# Patient Record
Sex: FEMALE | Race: WHITE | Employment: OTHER
[De-identification: names, ages, dates, MRNs, and addresses within clinical notes are randomized per-mention and may not be internally consistent; named-entity substitution may affect disease eponyms.]

---

## 2017-07-08 ENCOUNTER — HEALTH MAINTENANCE LETTER (OUTPATIENT)
Age: 77
End: 2017-07-08

## 2018-05-21 ENCOUNTER — OFFICE VISIT (OUTPATIENT)
Dept: DERMATOLOGY | Facility: CLINIC | Age: 78
End: 2018-05-21
Payer: COMMERCIAL

## 2018-05-21 VITALS — SYSTOLIC BLOOD PRESSURE: 130 MMHG | TEMPERATURE: 98.7 F | DIASTOLIC BLOOD PRESSURE: 73 MMHG | HEART RATE: 56 BPM

## 2018-05-21 DIAGNOSIS — L65.9 LOSS OF HAIR: Primary | ICD-10-CM

## 2018-05-21 DIAGNOSIS — L65.0 TELOGEN EFFLUVIUM: ICD-10-CM

## 2018-05-21 LAB
ALBUMIN SERPL-MCNC: 3.5 G/DL (ref 3.4–5)
ALP SERPL-CCNC: 83 U/L (ref 40–150)
ALT SERPL W P-5'-P-CCNC: 25 U/L (ref 0–50)
ANION GAP SERPL CALCULATED.3IONS-SCNC: 6 MMOL/L (ref 3–14)
AST SERPL W P-5'-P-CCNC: 34 U/L (ref 0–45)
BILIRUB SERPL-MCNC: 0.4 MG/DL (ref 0.2–1.3)
BUN SERPL-MCNC: 17 MG/DL (ref 7–30)
CALCIUM SERPL-MCNC: 8.6 MG/DL (ref 8.5–10.1)
CHLORIDE SERPL-SCNC: 112 MMOL/L (ref 94–109)
CO2 SERPL-SCNC: 25 MMOL/L (ref 20–32)
CREAT SERPL-MCNC: 0.89 MG/DL (ref 0.52–1.04)
DEPRECATED CALCIDIOL+CALCIFEROL SERPL-MC: 52 UG/L (ref 20–75)
FERRITIN SERPL-MCNC: 109 NG/ML (ref 8–252)
GFR SERPL CREATININE-BSD FRML MDRD: 62 ML/MIN/1.7M2
GLUCOSE SERPL-MCNC: 89 MG/DL (ref 70–99)
POTASSIUM SERPL-SCNC: 4.4 MMOL/L (ref 3.4–5.3)
PROT SERPL-MCNC: 6.7 G/DL (ref 6.8–8.8)
SODIUM SERPL-SCNC: 143 MMOL/L (ref 133–144)
TSH SERPL DL<=0.005 MIU/L-ACNC: 1.16 MU/L (ref 0.4–4)

## 2018-05-21 PROCEDURE — 84443 ASSAY THYROID STIM HORMONE: CPT | Performed by: PHYSICIAN ASSISTANT

## 2018-05-21 PROCEDURE — 86038 ANTINUCLEAR ANTIBODIES: CPT | Performed by: PHYSICIAN ASSISTANT

## 2018-05-21 PROCEDURE — 80053 COMPREHEN METABOLIC PANEL: CPT | Performed by: PHYSICIAN ASSISTANT

## 2018-05-21 PROCEDURE — 82728 ASSAY OF FERRITIN: CPT | Performed by: PHYSICIAN ASSISTANT

## 2018-05-21 PROCEDURE — 99203 OFFICE O/P NEW LOW 30 MIN: CPT | Performed by: PHYSICIAN ASSISTANT

## 2018-05-21 PROCEDURE — 99000 SPECIMEN HANDLING OFFICE-LAB: CPT | Performed by: PHYSICIAN ASSISTANT

## 2018-05-21 PROCEDURE — 84425 ASSAY OF VITAMIN B-1: CPT | Mod: 90 | Performed by: PHYSICIAN ASSISTANT

## 2018-05-21 PROCEDURE — 82306 VITAMIN D 25 HYDROXY: CPT | Performed by: PHYSICIAN ASSISTANT

## 2018-05-21 PROCEDURE — 84630 ASSAY OF ZINC: CPT | Mod: 90 | Performed by: PHYSICIAN ASSISTANT

## 2018-05-21 NOTE — LETTER
5/21/2018         RE: Danielle Diaz  [address] [facility] [address]        Dear Colleague,    Thank you for referring your patient, Danielle Diaz, to the [facility]. Please see a copy of my visit note below.    HPI:   Danielle Diaz is a 77 year old female who presents for evaluation of hair loss  chief complaint  Location: scalp - diffuse loss from her scalp   Condition present for:  2 months.   Previous treatments include: none  -recently started taking clonazepam for anxiety just prior to hair loss    Review Of Systems  Eyes: negative  Ears/Nose/Throat: negative  Respiratory: No shortness of breath, dyspnea on exertion, cough, or hemoptysis  Cardiovascular: negative  Gastrointestinal: negative  Genitourinary: negative  Musculoskeletal: negative  Neurologic: negative  Psychiatric: negative        PHYSICAL EXAM:    /73  Pulse 56  Temp 98.7  F (37.1  C) (Tympanic)  Skin exam performed as follows: Type 2 skin. Mood appropriate  Alert and Oriented X 3. Well developed, well nourished in no distress.  General appearance: Normal  Head including face: Normal  Eyes: conjunctiva and lids: Normal  Mouth: Lips, teeth, gums: Normal  Neck: Normal  Chest-breast/axillae: Normal  Back: Normal  Spleen and liver: Normal  Cardiovascular: Exam of peripheral vascular system by observation for swelling, varicosities, edema: Normal  Genitalia: groin, buttocks: Normal  Extremities: digits/nails (clubbing): Normal  Eccrine and Apocrine glands: Normal  Right upper extremity: Normal  Left upper extremity: Normal  Right lower extremity: Normal  Left lower extremity: Normal  Skin: Scalp and body hair: See below    1. Scalp normal without erythema; + hair pull    ASSESSMENT/PLAN:     1. 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 Discussed secondary to stress or change in health. Discussed that condition is typically self limited and can last 3-6 months; hair density will likely  return, but does not always do so.   --Check CBC, CMP, TSH, OLVIN, Ferritn, Vitamin D, Zinc, B1        Follow-up: PRN/pending labs  CC:   Scribed By: Wendi Montelongo, MS, PA-C      Again, thank you for allowing me to participate in the care of your patient.        Sincerely,        Wendi Montelongo PA-C

## 2018-05-21 NOTE — MR AVS SNAPSHOT
"              After Visit Summary   5/21/2018    Danielle Diaz    MRN: [id number]           Patient Information     Date Of Birth          1940        Visit Information        Provider Department      5/21/2018 12:45 PM Wendi Montelongo PA-C [facility]        Today's Diagnoses     Loss of hair    -  1      Care Instructions    You have telogen effluvium - stress response that causes loss of hair. This usually lasts for 3-6 months, then you will notice the shedding slow down. It is normal to lose about 100 hairs per day. Slowly, your hair will grow back.     Check blood work today to make sure you do not have any vitamin deficiencies.     Could be from the new medication (clonazepam)               Follow-ups after your visit        Who to contact     If you have questions or need follow up information about today's clinic visit or your schedule please contact [facility] directly at [phone].  Normal or non-critical lab and imaging results will be communicated to you by MyChart, letter or phone within 4 business days after the clinic has received the results. If you do not hear from us within 7 days, please contact the clinic through [employer] or phone. If you have a critical or abnormal lab result, we will notify you by phone as soon as possible.  Submit refill requests through [employer] or call your pharmacy and they will forward the refill request to us. Please allow 3 business days for your refill to be completed.          Additional Information About Your Visit        MyChart Information     [employer] lets you send messages to your doctor, view your test results, renew your prescriptions, schedule appointments and more. To sign up, go to [email or web address] . Click on \"Log in\" on the left side of the screen, which will take you to the Welcome page. Then click on \"Sign up Now\" on the right side of the page.     You will be asked to enter the access code listed below, as well " as some personal information. Please follow the directions to create your username and password.     Your access code is: [email or web address]  Expires: 2018  1:21 PM     Your access code will  in 90 days. If you need help or a new code, please call your [facility] clinic or [phone].        Care EveryWhere ID     This is your Care EveryWhere ID. This could be used by other organizations to access your [facility] medical records  [id number]        Your Vitals Were     Pulse Temperature                56 98.7  F (37.1  C) (Tympanic)           Blood Pressure from Last 3 Encounters:   18 130/73   02/25/15 140/86   11 124/80    Weight from Last 3 Encounters:   02/25/15 96.2 kg (212 lb)   11 82.6 kg (182 lb)   11 81.2 kg (179 lb)              We Performed the Following     Anti Nuclear Halina IgG by IFA with Reflex     CBC with platelets     Comprehensive metabolic panel     Ferritin     TSH with free T4 reflex     Vitamin B1 whole blood     Vitamin D Deficiency     Zinc        Primary Care Provider Office Phone # Fax #    Rachana Montilla [phone]       [address] [place] [address]        Equal Access to Services     SARAH BETH HARRIS AH: Hadii mauricio ku hadasho Soomaali, waaxda luqadaha, qaybta kaalmada adeegyada, donna silva. So [facility] [phone].    ATENCIÓN: Si habla español, tiene a ozuna disposición servicios gratuitos de asistencia lingüística. Llame al [phone].    We comply with applicable federal civil rights laws and [place] laws. We do not discriminate on the basis of race, color, national origin, age, disability, sex, sexual orientation, or gender identity.            Thank you!     Thank you for choosing [facility]  for your care. Our goal is always to provide you with excellent care. Hearing back from our patients is one way we can continue to improve our services. Please take a few minutes to complete the written  survey that you may receive in the mail after your visit with us. Thank you!             Your Updated Medication List - Protect others around you: Learn how to safely use, store and throw away your medicines at www.disposemymeds.org.          This list is accurate as of 5/21/18  1:21 PM.  Always use your most recent med list.                   Brand Name Dispense Instructions for use Diagnosis    ** PATIENT ALERT **      Warning:  DO NOT EXCEED 4,000 MG OF ACETAMINOPHEN FROM ALL SOURCES IN 24 HOURS        * albuterol (2.5 MG/3ML) 0.083% neb solution     1 Box    Take 3 mLs by nebulization every 6 hours as needed for shortness of breath / dyspnea.    Severe persistent asthma       * VENTOLIN  (90 Base) MCG/ACT Inhaler   Generic drug:  albuterol     1 Inhaler    Inhale 2 puffs into the lungs every 4 hours as needed. FOR COUGH, 2 PUFFS 15 MINUTES BEFORE EXERCISE    Rhinitis, allergic, perennial, Severe persistent asthma       atenolol 25 MG tablet    TENORMIN    30 tablet    Take 1 tablet by mouth daily.    Essential hypertension, benign       CALCIUM + D PO      1200mg ORALLY DAILY        cetirizine 10 MG tablet    zyrTEC    90 tablet    Take 1 tablet (10 mg) by mouth every evening    Dermatitis       desonide 0.05 % cream    DESOWEN    60 g    BID AS NEEDED/COPOUND WITH KETOCONAZOLE 2%    Contact dermatitis and other eczema, due to unspecified cause       desoximetasone 0.25 % cream    TOPICORT    100 g    Apply sparingly to affected area twice daily for 14 days.  Do not apply to face.    Dermatitis       fish oil-omega-3 fatty acids 1000 MG capsule      1 CAPSULE ORALLY DAILY        hydrOXYzine 25 MG tablet    ATARAX    60 tablet    Take 1-2 tablets (25-50 mg) by mouth At Bedtime    Dermatitis       MUCINEX 600 MG 12 hr tablet   Generic drug:  guaiFENesin      1 TABLET ORALLY EVERY 12 HOURS AS NEEDED        MULTIVITAMIN PO      1 TABLET ORALLY DAILY        * oxyCODONE IR 5 MG tablet    ROXICODONE    60 tablet     Take 1 tablet by mouth every 8 hours as needed for pain. Up to 2/day. Fill in June    Unspecified sinusitis (chronic), Generalized pain       * oxyCODONE IR 5 MG tablet    ROXICODONE    60 tablet    Take 1 tablet by mouth every 8 hours as needed for pain. Up to 2/day. Fill in July    Unspecified sinusitis (chronic), Generalized pain       * oxyCODONE IR 5 MG tablet    ROXICODONE    60 tablet    Take 1 tablet by mouth every 8 hours as needed for pain. Up to 2/day. Fill in August    Unspecified sinusitis (chronic), Generalized pain       pantoprazole 40 MG EC tablet    PROTONIX    60 tablet    Take 1 tablet by mouth 2 times daily.    GERD (gastroesophageal reflux disease), Nausea       PRISTIQ 50 MG 24 hr tablet   Generic drug:  desvenlafaxine succinate      Take 50 mg by mouth daily. 1 tab in am, per Dr. Mónica Herring,  in [place]        RELPAX 40 MG tablet   Generic drug:  eletriptan     6  boxes    ONE TABLET WITH ONSET OF MIGRAINE, MAY REPEAT ONCE AFTER 2 HOURS. DO NOT EXCEED 2 TABLETS IN 24 HOURS.    Other forms of migraine, with intractable migraine, so stated, without mention of status migrainosus       simvastatin 40 MG tablet    ZOCOR    90 tablet    Take 1 tablet by mouth At Bedtime. at bedtime.    Hyperlipidemia LDL goal <130       SINGULAIR 10 MG tablet   Generic drug:  montelukast      Take 10 mg by mouth At Bedtime.        SYMBICORT 160-4.5 MCG/ACT Inhaler   Generic drug:  budesonide-formoterol     2 Inhaler    2 puffs every 12 hours    Severe persistent asthma, Rhinitis, allergic, perennial       topiramate 50 MG tablet    TOPAMAX    60 tablet    Take  by mouth. Take 1/2 tab in am, and one at bedtime (please let me know if can't be cut), after 4 days, can go to 50 mg bid    Headache(784.0)       traZODone 100 MG tablet    DESYREL    30 tablet    Take  by mouth. for sleepONE TABLET AT BEDTIME AS NEEDED FOR SLEEP    Major depressive disorder, recurrent episode, unspecified, Sleep disorder        TYLENOL EXTRA STRENGTH 500 MG Tabs      3 TABLETS ORALLY EVERY 4 HOURS AS NEEDED        ZOFRAN 8 MG tablet   Generic drug:  ondansetron     68 Tab    1 TABLET 2 TWICE DAILY AS NEEDED- NAUSEA    GERD (gastroesophageal reflux disease), Nausea       * Notice:  This list has 5 medication(s) that are the same as other medications prescribed for you. Read the directions carefully, and ask your doctor or other care provider to review them with you.

## 2018-05-21 NOTE — PATIENT INSTRUCTIONS
You have telogen effluvium - stress response that causes loss of hair. This usually lasts for 3-6 months, then you will notice the shedding slow down. It is normal to lose about 100 hairs per day. Slowly, your hair will grow back.     Check blood work today to make sure you do not have any vitamin deficiencies.     Could be from the new medication (clonazepam)

## 2018-05-21 NOTE — PROGRESS NOTES
HPI:   Danielle Diaz is a 77 year old female who presents for evaluation of hair loss  chief complaint  Location: scalp - diffuse loss from her scalp   Condition present for:  2 months.   Previous treatments include: none  -recently started taking clonazepam for anxiety just prior to hair loss    Review Of Systems  Eyes: negative  Ears/Nose/Throat: negative  Respiratory: No shortness of breath, dyspnea on exertion, cough, or hemoptysis  Cardiovascular: negative  Gastrointestinal: negative  Genitourinary: negative  Musculoskeletal: negative  Neurologic: negative  Psychiatric: negative        PHYSICAL EXAM:    /73  Pulse 56  Temp 98.7  F (37.1  C) (Tympanic)  Skin exam performed as follows: Type 2 skin. Mood appropriate  Alert and Oriented X 3. Well developed, well nourished in no distress.  General appearance: Normal  Head including face: Normal  Eyes: conjunctiva and lids: Normal  Mouth: Lips, teeth, gums: Normal  Neck: Normal  Chest-breast/axillae: Normal  Back: Normal  Spleen and liver: Normal  Cardiovascular: Exam of peripheral vascular system by observation for swelling, varicosities, edema: Normal  Genitalia: groin, buttocks: Normal  Extremities: digits/nails (clubbing): Normal  Eccrine and Apocrine glands: Normal  Right upper extremity: Normal  Left upper extremity: Normal  Right lower extremity: Normal  Left lower extremity: Normal  Skin: Scalp and body hair: See below    1. Scalp normal without erythema; + hair pull    ASSESSMENT/PLAN:     1. 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 Discussed secondary to stress or change in health. Discussed that condition is typically self limited and can last 3-6 months; hair density will likely return, but does not always do so.   --Check CBC, CMP, TSH, OLVIN, Ferritn, Vitamin D, Zinc, B1        Follow-up: PRN/pending labs  CC:   Scribed By: Wendi Montelongo, MS, PA-C

## 2018-05-22 LAB — ANA SER QL IF: NEGATIVE

## 2018-05-23 LAB
ERYTHROCYTE [DISTWIDTH] IN BLOOD BY AUTOMATED COUNT: NORMAL % (ref 10–15)
HCT VFR BLD AUTO: NORMAL % (ref 35–47)
HGB BLD-MCNC: NORMAL G/DL (ref 11.7–15.7)
MCH RBC QN AUTO: NORMAL PG (ref 26.5–33)
MCHC RBC AUTO-ENTMCNC: NORMAL G/DL (ref 31.5–36.5)
MCV RBC AUTO: NORMAL FL (ref 78–100)
PLATELET # BLD AUTO: NORMAL 10E9/L (ref 150–450)
RBC # BLD AUTO: NORMAL 10E12/L (ref 3.8–5.2)
RETICS # AUTO: NORMAL 10E9/L (ref 25–95)
RETICS/RBC NFR AUTO: NORMAL % (ref 0.5–2)
WBC # BLD AUTO: NORMAL 10E9/L (ref 4–11)
ZINC SERPL-MCNC: 60 UG/DL (ref 60–120)

## 2018-05-24 DIAGNOSIS — L65.9 LOSS OF HAIR: Primary | ICD-10-CM

## 2018-05-25 ENCOUNTER — TELEPHONE (OUTPATIENT)
Dept: DERMATOLOGY | Facility: CLINIC | Age: 78
End: 2018-05-25

## 2018-05-25 LAB — VIT B1 BLD-MCNC: 128 NMOL/L (ref 70–180)

## 2018-05-25 NOTE — TELEPHONE ENCOUNTER
Unable to see that pt needs labs at this time as she just had them done on 5/21/18 and they were normal. Spoke with Juana at [facility] and informed her that there was not a need for labs right now. Please advise when pt is to have follow up cbc that is ordered for future.  Prudence HADDAD RN BSN PHN  [facility] Clin[facility]

## 2018-05-25 NOTE — TELEPHONE ENCOUNTER
Juana from [address] [place] [address] is waiting for lab order for pt, pt is waiting there , fax to [phone] Att: Juana

## 2018-05-25 NOTE — TELEPHONE ENCOUNTER
I believe lab called and wanted another CBC since the specimen was hemolyzed. Lab said they would put in a future order for this. Please have her get this done

## 2018-05-25 NOTE — TELEPHONE ENCOUNTER
Orders faxed with directions to call pt and have her get labs done.  Prudence HADDAD RN BSN PHN  [facility]

## 2018-07-15 ENCOUNTER — HEALTH MAINTENANCE LETTER (OUTPATIENT)
Age: 78
End: 2018-07-15

## 2021-10-21 ENCOUNTER — HOSPITAL ENCOUNTER (OUTPATIENT)
Facility: CLINIC | Age: 81
Setting detail: OBSERVATION
Discharge: NURSING, REHAB OR LONG-TERM CARE | End: 2021-10-25
Attending: NURSE PRACTITIONER | Admitting: INTERNAL MEDICINE
Payer: COMMERCIAL

## 2021-10-21 ENCOUNTER — APPOINTMENT (OUTPATIENT)
Dept: CT IMAGING | Facility: CLINIC | Age: 81
End: 2021-10-21
Attending: NURSE PRACTITIONER
Payer: COMMERCIAL

## 2021-10-21 DIAGNOSIS — M54.2 CHRONIC NECK PAIN: ICD-10-CM

## 2021-10-21 DIAGNOSIS — F32.1 MODERATE MAJOR DEPRESSION (H): Primary | ICD-10-CM

## 2021-10-21 DIAGNOSIS — Z85.42 PERSONAL HISTORY OF MALIGNANT NEOPLASM OF OTHER PARTS OF UTERUS: ICD-10-CM

## 2021-10-21 DIAGNOSIS — G89.29 CHRONIC NECK PAIN: ICD-10-CM

## 2021-10-21 DIAGNOSIS — I48.0 PAROXYSMAL ATRIAL FIBRILLATION (H): ICD-10-CM

## 2021-10-21 DIAGNOSIS — R41.0 ACUTE DELIRIUM: ICD-10-CM

## 2021-10-21 PROBLEM — G47.00 INSOMNIA: Status: ACTIVE | Noted: 2018-02-05

## 2021-10-21 PROBLEM — I82.431 ACUTE DEEP VEIN THROMBOSIS (DVT) OF POPLITEAL VEIN OF RIGHT LOWER EXTREMITY (H): Status: ACTIVE | Noted: 2019-11-25

## 2021-10-21 PROBLEM — G63 VITAMIN B12 DEFICIENCY NEUROPATHY (H): Status: ACTIVE | Noted: 2018-11-21

## 2021-10-21 PROBLEM — F01.50 VASCULAR DEMENTIA (H): Status: ACTIVE | Noted: 2018-01-01

## 2021-10-21 PROBLEM — E53.8 VITAMIN B12 DEFICIENCY NEUROPATHY (H): Status: ACTIVE | Noted: 2018-11-21

## 2021-10-21 PROBLEM — K52.9 CHRONIC DIARRHEA: Status: ACTIVE | Noted: 2018-06-07

## 2021-10-21 LAB
ALBUMIN SERPL-MCNC: 3.3 G/DL (ref 3.4–5)
ALBUMIN UR-MCNC: NEGATIVE MG/DL
ALP SERPL-CCNC: 99 U/L (ref 40–150)
ALT SERPL W P-5'-P-CCNC: 32 U/L (ref 0–50)
AMMONIA PLAS-SCNC: 20 UMOL/L (ref 10–50)
AMPHETAMINES UR QL SCN: NORMAL
ANION GAP SERPL CALCULATED.3IONS-SCNC: 7 MMOL/L (ref 3–14)
APAP SERPL-MCNC: <2 MG/L (ref 10–30)
APPEARANCE UR: ABNORMAL
AST SERPL W P-5'-P-CCNC: 39 U/L (ref 0–45)
BACTERIA #/AREA URNS HPF: ABNORMAL /HPF
BARBITURATES UR QL: NORMAL
BASOPHILS # BLD AUTO: 0 10E3/UL (ref 0–0.2)
BASOPHILS NFR BLD AUTO: 1 %
BENZODIAZ UR QL: NORMAL
BILIRUB SERPL-MCNC: 0.9 MG/DL (ref 0.2–1.3)
BILIRUB UR QL STRIP: NEGATIVE
BUN SERPL-MCNC: 17 MG/DL (ref 7–30)
CALCIUM SERPL-MCNC: 8.9 MG/DL (ref 8.5–10.1)
CANNABINOIDS UR QL SCN: NORMAL
CHLORIDE BLD-SCNC: 111 MMOL/L (ref 94–109)
CO2 SERPL-SCNC: 25 MMOL/L (ref 20–32)
COCAINE UR QL: NORMAL
COLOR UR AUTO: YELLOW
CREAT SERPL-MCNC: 0.72 MG/DL (ref 0.52–1.04)
EOSINOPHIL # BLD AUTO: 0.1 10E3/UL (ref 0–0.7)
EOSINOPHIL NFR BLD AUTO: 3 %
ERYTHROCYTE [DISTWIDTH] IN BLOOD BY AUTOMATED COUNT: 13.9 % (ref 10–15)
ETHANOL SERPL-MCNC: <0.01 G/DL
GFR SERPL CREATININE-BSD FRML MDRD: 79 ML/MIN/1.73M2
GLUCOSE BLD-MCNC: 100 MG/DL (ref 70–99)
GLUCOSE UR STRIP-MCNC: NEGATIVE MG/DL
HCT VFR BLD AUTO: 39.4 % (ref 35–47)
HGB BLD-MCNC: 12.7 G/DL (ref 11.7–15.7)
HGB UR QL STRIP: ABNORMAL
HOLD SPECIMEN: NORMAL
HOLD SPECIMEN: NORMAL
HYALINE CASTS: 2 /LPF
IMM GRANULOCYTES # BLD: 0 10E3/UL
IMM GRANULOCYTES NFR BLD: 0 %
INR PPP: 2.31 (ref 0.85–1.15)
KETONES UR STRIP-MCNC: 20 MG/DL
LEUKOCYTE ESTERASE UR QL STRIP: NEGATIVE
LYMPHOCYTES # BLD AUTO: 0.8 10E3/UL (ref 0.8–5.3)
LYMPHOCYTES NFR BLD AUTO: 19 %
MCH RBC QN AUTO: 30.8 PG (ref 26.5–33)
MCHC RBC AUTO-ENTMCNC: 32.2 G/DL (ref 31.5–36.5)
MCV RBC AUTO: 95 FL (ref 78–100)
MONOCYTES # BLD AUTO: 0.4 10E3/UL (ref 0–1.3)
MONOCYTES NFR BLD AUTO: 11 %
MUCOUS THREADS #/AREA URNS LPF: PRESENT /LPF
NEUTROPHILS # BLD AUTO: 2.8 10E3/UL (ref 1.6–8.3)
NEUTROPHILS NFR BLD AUTO: 66 %
NITRATE UR QL: NEGATIVE
NRBC # BLD AUTO: 0 10E3/UL
NRBC BLD AUTO-RTO: 0 /100
OPIATES UR QL SCN: NORMAL
PCP UR QL SCN: NORMAL
PH UR STRIP: 6 [PH] (ref 5–7)
PLATELET # BLD AUTO: 159 10E3/UL (ref 150–450)
POTASSIUM BLD-SCNC: 3.5 MMOL/L (ref 3.4–5.3)
PROT SERPL-MCNC: 7.1 G/DL (ref 6.8–8.8)
RBC # BLD AUTO: 4.13 10E6/UL (ref 3.8–5.2)
RBC URINE: 2 /HPF
SALICYLATES SERPL-MCNC: <2 MG/DL
SARS-COV-2 RNA RESP QL NAA+PROBE: NEGATIVE
SODIUM SERPL-SCNC: 143 MMOL/L (ref 133–144)
SP GR UR STRIP: 1.01 (ref 1–1.03)
TSH SERPL DL<=0.005 MIU/L-ACNC: 1.63 MU/L (ref 0.4–4)
UROBILINOGEN UR STRIP-MCNC: NORMAL MG/DL
WBC # BLD AUTO: 4.1 10E3/UL (ref 4–11)
WBC URINE: 5 /HPF

## 2021-10-21 PROCEDURE — 82077 ASSAY SPEC XCP UR&BREATH IA: CPT | Performed by: NURSE PRACTITIONER

## 2021-10-21 PROCEDURE — 82746 ASSAY OF FOLIC ACID SERUM: CPT | Performed by: PHYSICIAN ASSISTANT

## 2021-10-21 PROCEDURE — 93005 ELECTROCARDIOGRAM TRACING: CPT

## 2021-10-21 PROCEDURE — 99220 PR INITIAL OBSERVATION CARE,LEVEL III: CPT | Performed by: PHYSICIAN ASSISTANT

## 2021-10-21 PROCEDURE — G0378 HOSPITAL OBSERVATION PER HR: HCPCS

## 2021-10-21 PROCEDURE — 80179 DRUG ASSAY SALICYLATE: CPT | Performed by: NURSE PRACTITIONER

## 2021-10-21 PROCEDURE — 81003 URINALYSIS AUTO W/O SCOPE: CPT | Performed by: NURSE PRACTITIONER

## 2021-10-21 PROCEDURE — 85025 COMPLETE CBC W/AUTO DIFF WBC: CPT | Performed by: NURSE PRACTITIONER

## 2021-10-21 PROCEDURE — 93010 ELECTROCARDIOGRAM REPORT: CPT | Performed by: NURSE PRACTITIONER

## 2021-10-21 PROCEDURE — 80307 DRUG TEST PRSMV CHEM ANLYZR: CPT | Performed by: NURSE PRACTITIONER

## 2021-10-21 PROCEDURE — 99284 EMERGENCY DEPT VISIT MOD MDM: CPT | Performed by: NURSE PRACTITIONER

## 2021-10-21 PROCEDURE — 70450 CT HEAD/BRAIN W/O DYE: CPT

## 2021-10-21 PROCEDURE — 36415 COLL VENOUS BLD VENIPUNCTURE: CPT | Performed by: NURSE PRACTITIONER

## 2021-10-21 PROCEDURE — C9803 HOPD COVID-19 SPEC COLLECT: HCPCS

## 2021-10-21 PROCEDURE — 82040 ASSAY OF SERUM ALBUMIN: CPT | Performed by: NURSE PRACTITIONER

## 2021-10-21 PROCEDURE — 82140 ASSAY OF AMMONIA: CPT | Performed by: PHYSICIAN ASSISTANT

## 2021-10-21 PROCEDURE — 99285 EMERGENCY DEPT VISIT HI MDM: CPT | Mod: 25

## 2021-10-21 PROCEDURE — 85610 PROTHROMBIN TIME: CPT | Performed by: NURSE PRACTITIONER

## 2021-10-21 PROCEDURE — 84443 ASSAY THYROID STIM HORMONE: CPT | Performed by: NURSE PRACTITIONER

## 2021-10-21 PROCEDURE — 87635 SARS-COV-2 COVID-19 AMP PRB: CPT | Performed by: NURSE PRACTITIONER

## 2021-10-21 PROCEDURE — 82607 VITAMIN B-12: CPT | Performed by: PHYSICIAN ASSISTANT

## 2021-10-21 PROCEDURE — 36415 COLL VENOUS BLD VENIPUNCTURE: CPT | Performed by: PHYSICIAN ASSISTANT

## 2021-10-21 PROCEDURE — 80143 DRUG ASSAY ACETAMINOPHEN: CPT | Performed by: NURSE PRACTITIONER

## 2021-10-21 RX ORDER — MIRTAZAPINE 15 MG/1
15 TABLET, FILM COATED ORAL
Status: ON HOLD | COMMUNITY
End: 2021-10-25

## 2021-10-21 RX ORDER — FLUOXETINE 10 MG/1
20 CAPSULE ORAL DAILY
Status: ON HOLD | COMMUNITY
Start: 2019-11-10 | End: 2021-10-25

## 2021-10-21 RX ORDER — METOPROLOL SUCCINATE 50 MG
25 TABLET, EXTENDED RELEASE 24 HR ORAL
Status: ON HOLD | COMMUNITY
End: 2021-10-22

## 2021-10-21 RX ORDER — PROCHLORPERAZINE 25 MG
12.5 SUPPOSITORY, RECTAL RECTAL EVERY 12 HOURS PRN
Status: DISCONTINUED | OUTPATIENT
Start: 2021-10-21 | End: 2021-10-25 | Stop reason: HOSPADM

## 2021-10-21 RX ORDER — ACETAMINOPHEN 325 MG/1
650 TABLET ORAL EVERY 6 HOURS PRN
Status: DISCONTINUED | OUTPATIENT
Start: 2021-10-21 | End: 2021-10-25 | Stop reason: HOSPADM

## 2021-10-21 RX ORDER — ONDANSETRON 2 MG/ML
4 INJECTION INTRAMUSCULAR; INTRAVENOUS EVERY 6 HOURS PRN
Status: DISCONTINUED | OUTPATIENT
Start: 2021-10-21 | End: 2021-10-25 | Stop reason: HOSPADM

## 2021-10-21 RX ORDER — AMOXICILLIN 250 MG
1 CAPSULE ORAL 2 TIMES DAILY PRN
Status: DISCONTINUED | OUTPATIENT
Start: 2021-10-21 | End: 2021-10-25 | Stop reason: HOSPADM

## 2021-10-21 RX ORDER — ACETAMINOPHEN 650 MG/1
650 SUPPOSITORY RECTAL EVERY 6 HOURS PRN
Status: DISCONTINUED | OUTPATIENT
Start: 2021-10-21 | End: 2021-10-25 | Stop reason: HOSPADM

## 2021-10-21 RX ORDER — AMOXICILLIN 250 MG
2 CAPSULE ORAL 2 TIMES DAILY PRN
Status: DISCONTINUED | OUTPATIENT
Start: 2021-10-21 | End: 2021-10-25 | Stop reason: HOSPADM

## 2021-10-21 RX ORDER — PROCHLORPERAZINE MALEATE 5 MG
5 TABLET ORAL EVERY 6 HOURS PRN
Status: DISCONTINUED | OUTPATIENT
Start: 2021-10-21 | End: 2021-10-25 | Stop reason: HOSPADM

## 2021-10-21 RX ORDER — ARIPIPRAZOLE 2 MG/1
2 TABLET ORAL
Status: ON HOLD | COMMUNITY
Start: 2019-11-10 | End: 2021-10-25

## 2021-10-21 RX ORDER — ONDANSETRON 4 MG/1
4 TABLET, ORALLY DISINTEGRATING ORAL EVERY 6 HOURS PRN
Status: DISCONTINUED | OUTPATIENT
Start: 2021-10-21 | End: 2021-10-25 | Stop reason: HOSPADM

## 2021-10-21 NOTE — ED TRIAGE NOTES
Pt here via nephew. Has memory loss long term but lives alone and might not be taking her meds. Pt got up at 1am and ate a whole bottle of tums. Nephew is looking for placement but states she can'r be alone and take care of herself
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
## 2021-10-22 ENCOUNTER — APPOINTMENT (OUTPATIENT)
Dept: OCCUPATIONAL THERAPY | Facility: CLINIC | Age: 81
End: 2021-10-22
Payer: COMMERCIAL

## 2021-10-22 LAB
ANION GAP SERPL CALCULATED.3IONS-SCNC: 3 MMOL/L (ref 3–14)
BUN SERPL-MCNC: 12 MG/DL (ref 7–30)
CALCIUM SERPL-MCNC: 8.2 MG/DL (ref 8.5–10.1)
CHLORIDE BLD-SCNC: 112 MMOL/L (ref 94–109)
CO2 SERPL-SCNC: 27 MMOL/L (ref 20–32)
CREAT SERPL-MCNC: 0.64 MG/DL (ref 0.52–1.04)
ERYTHROCYTE [DISTWIDTH] IN BLOOD BY AUTOMATED COUNT: 13.6 % (ref 10–15)
FOLATE SERPL-MCNC: NORMAL NG/ML
GFR SERPL CREATININE-BSD FRML MDRD: 84 ML/MIN/1.73M2
GLUCOSE BLD-MCNC: 82 MG/DL (ref 70–99)
HCT VFR BLD AUTO: 35.2 % (ref 35–47)
HGB BLD-MCNC: 11.5 G/DL (ref 11.7–15.7)
MCH RBC QN AUTO: 30.3 PG (ref 26.5–33)
MCHC RBC AUTO-ENTMCNC: 32.7 G/DL (ref 31.5–36.5)
MCV RBC AUTO: 93 FL (ref 78–100)
PLATELET # BLD AUTO: 142 10E3/UL (ref 150–450)
POTASSIUM BLD-SCNC: 3.7 MMOL/L (ref 3.4–5.3)
RBC # BLD AUTO: 3.8 10E6/UL (ref 3.8–5.2)
SODIUM SERPL-SCNC: 142 MMOL/L (ref 133–144)
VIT B12 SERPL-MCNC: 316 PG/ML (ref 193–986)
WBC # BLD AUTO: 4.2 10E3/UL (ref 4–11)

## 2021-10-22 PROCEDURE — 85027 COMPLETE CBC AUTOMATED: CPT | Performed by: PHYSICIAN ASSISTANT

## 2021-10-22 PROCEDURE — 99225 PR SUBSEQUENT OBSERVATION CARE,LEVEL II: CPT | Performed by: INTERNAL MEDICINE

## 2021-10-22 PROCEDURE — 36415 COLL VENOUS BLD VENIPUNCTURE: CPT | Performed by: PHYSICIAN ASSISTANT

## 2021-10-22 PROCEDURE — 97165 OT EVAL LOW COMPLEX 30 MIN: CPT | Mod: GO

## 2021-10-22 PROCEDURE — 99207 PR CDG-CODE CATEGORY CHANGED: CPT | Performed by: INTERNAL MEDICINE

## 2021-10-22 PROCEDURE — 999N000111 HC STATISTIC OT IP EVAL DEFER

## 2021-10-22 PROCEDURE — 250N000013 HC RX MED GY IP 250 OP 250 PS 637: Performed by: PHYSICIAN ASSISTANT

## 2021-10-22 PROCEDURE — 97129 THER IVNTJ 1ST 15 MIN: CPT | Mod: GO

## 2021-10-22 PROCEDURE — G0378 HOSPITAL OBSERVATION PER HR: HCPCS

## 2021-10-22 PROCEDURE — 80048 BASIC METABOLIC PNL TOTAL CA: CPT | Performed by: PHYSICIAN ASSISTANT

## 2021-10-22 RX ORDER — VERAPAMIL HYDROCHLORIDE 120 MG/1
120 CAPSULE, EXTENDED RELEASE ORAL AT BEDTIME
Status: ON HOLD | COMMUNITY
End: 2021-10-25

## 2021-10-22 RX ORDER — QUETIAPINE FUMARATE 100 MG/1
100 TABLET, FILM COATED ORAL AT BEDTIME
COMMUNITY

## 2021-10-22 RX ORDER — LETROZOLE 2.5 MG/1
2.5 TABLET, FILM COATED ORAL DAILY
COMMUNITY
Start: 2021-10-02

## 2021-10-22 RX ORDER — BENZTROPINE MESYLATE 0.5 MG/1
0.5 TABLET ORAL AT BEDTIME
COMMUNITY
Start: 2021-10-12

## 2021-10-22 RX ORDER — METOPROLOL SUCCINATE 25 MG/1
25 TABLET, EXTENDED RELEASE ORAL DAILY
COMMUNITY

## 2021-10-22 RX ORDER — SUMATRIPTAN 50 MG/1
50 TABLET, FILM COATED ORAL
COMMUNITY

## 2021-10-22 RX ORDER — HYDRALAZINE HYDROCHLORIDE 10 MG/1
10 TABLET, FILM COATED ORAL EVERY 6 HOURS PRN
Status: DISCONTINUED | OUTPATIENT
Start: 2021-10-22 | End: 2021-10-25 | Stop reason: HOSPADM

## 2021-10-22 RX ORDER — WARFARIN SODIUM 5 MG/1
5 TABLET ORAL DAILY
Status: ON HOLD | COMMUNITY
End: 2021-10-25

## 2021-10-22 RX ORDER — FUROSEMIDE 20 MG
20 TABLET ORAL DAILY
Status: ON HOLD | COMMUNITY
End: 2021-10-25

## 2021-10-22 RX ORDER — ATORVASTATIN CALCIUM 20 MG/1
20 TABLET, FILM COATED ORAL DAILY
COMMUNITY

## 2021-10-22 RX ORDER — OMEPRAZOLE 40 MG/1
40 CAPSULE, DELAYED RELEASE ORAL DAILY
COMMUNITY

## 2021-10-22 RX ADMIN — MICONAZOLE NITRATE: 20 POWDER TOPICAL at 07:37

## 2021-10-22 RX ADMIN — MICONAZOLE NITRATE: 20 POWDER TOPICAL at 20:30

## 2021-10-22 RX ADMIN — MICONAZOLE NITRATE: 20 POWDER TOPICAL at 00:59

## 2021-10-22 ASSESSMENT — ACTIVITIES OF DAILY LIVING (ADL): DEPENDENT_IADLS:: INDEPENDENT

## 2021-10-22 NOTE — PLAN OF CARE
Occupational Therapy Discharge Summary    Reason for therapy discharge:    All goals and outcomes met, no further needs identified.    Progress towards therapy goal(s). See goals on Care Plan in [facility] electronic health record for goal details.  Goals met    Therapy recommendation(s):  HH and OT for safety within pts environment    Nohelia Dumas OTR

## 2021-10-22 NOTE — ED NOTES
Pt here with nephew, per nephew report pt has had some increased confusion. Pt is now living with nephew. Per nephew report pt called him on Tuesday in a panic and was very confused. Pt has demonstrated some odd behavior, eating a tub of tums and some kleenex one night. Pt is alert to self only, denies pain at this time.

## 2021-10-22 NOTE — PROGRESS NOTES
10/22/21 1104   Quick Adds   Quick Adds Certification   Type of Visit Initial Occupational Therapy Evaluation   Living Environment   People in home alone   Current Living Arrangements house   Transportation Anticipated family or friend will provide   Living Environment Comments Lives alone. Pt states she is I with ADLs and uses a cane. She states she drives. Sets up   her own meds . No home services   General Information   Onset of Illness/Injury or Date of Surgery 10/22/21   Referring Physician Harrison   Patient/Family Therapy Goal Statement (OT) Pt would like to return home   Additional Occupational Profile Info/Pertinent History of Current Problem 82 yo F came in with acute delirium. PMH: depression, dementia, anxiety.   Existing Precautions/Restrictions no known precautions/restrictions   General Observations and Info Observing and evaluating ADLs and related mobiity in room pt was able to perform without confusion   Cognitive Status Examination   Orientation Status person;place   Affect/Mental Status (Cognitive) WFL   Follows Commands WFL   Memory Deficit severe deficit   Pain Assessment   Patient Currently in Pain No   Bed Mobility   Supine-Sit [place] (Bed Mobility) independent   Sit-Supine [place] (Bed Mobility) independent   Sit-Stand Transfer   Sit-Stand [place] (Transfers) supervision   Assistive Device (Sit-Stand Transfers) walker, front-wheeled   Toilet Transfer   [place] Level (Toilet Transfer) independent   Grooming Assessment   [place] Level (Grooming) supervision   Position (Grooming) sink side;unsupported standing   Toileting   [place] Level (Toileting) independent   Clinical Impression   Criteria for Skilled Therapeutic Interventions Met (OT) no   OT Diagnosis Confusion   OT Problem List-Impairments impacting ADL cognition   Planned Therapy Interventions (OT) bed mobility training   Clinical Decision Making Complexity (OT) low complexity   Therapy Frequency (OT)    (1 tx session)   Predicted Duration of Therapy   (1 treatment session)   Risk & Benefits of therapy have been explained risks/benefits reviewed   OT Discharge Planning    OT Discharge Recommendation (DC Rec) Home with assist   OT Rationale for DC Rec Pt is physically functional. I would recommend 24 hour A for safety in a home environment. Memory care also recommended vs LTC   Therapy Certification   Start of Care Date 10/22/21   Certification date from 10/22/21   Certification date to 10/22/21   Medical Diagnosis AMS   Total Evaluation Time (Minutes)   Total Evaluation Time (Minutes) 10     Nohelia Dumas, OTR

## 2021-10-22 NOTE — PROGRESS NOTES
[facility] [place]    Patient admitted to room 2308 at approximately 2300 via cart from emergency room. Patient was accompanied by transport tech.     Verbal SBAR report received from ED RN prior to patient arrival.     Patient ambulated to bed with stand-by assist. Patient alert and oriented X 3. The patient is not having any pain.  . Admission vital signs: Blood pressure (!) 168/77, pulse 73, temperature 97.6  F (36.4  C), temperature source Oral, resp. rate 18, weight 93.5 kg (206 lb 2.1 oz), SpO2 97 %. Patient was oriented to plan of care, call light, bed controls, tv, telephone, bathroom and visiting hours.     Risk Assessment    The following safety risks were identified during admission: fall and skin. Yellow risk band applied: YES.     Skin Initial Assessment    This writer admitted this patient and completed a full skin assessment and Isaías score in the Adult PCS flowsheet. Appropriate interventions initiated as needed.     Secondary skin check completed by Lindsay MORENO RN.         Education    Patient has a [place] to Observation order: Yes  Observation education completed and documented: Yes      Marsha Solis RN

## 2021-10-22 NOTE — PROGRESS NOTES
Writer is reviewing patient's chart for probable MedSurg admission while functioning as MedSurg Charge RN.  Tristan Jenkins RN

## 2021-10-22 NOTE — PLAN OF CARE
Patient is oriented to self and time, confused to situation and place. Easily redirectable. Denying any pain. 1+ edema in bilateral lower legs. Spoke with patient's nephew to attempt to do medication reconciliation, but nephew did not know what medications she was on or when she last took her pills. He did bring a bag of medications which were given to pharmacy to hold on to, but med rec was unable to be completed. IV is saline locked. VS stable, afebrile, clear lung sounds. BP (!) 144/80 (BP Location: Left arm)   Pulse 71   Temp 98.4  F (36.9  C) (Oral)   Resp 18   Wt 93.5 kg (206 lb 2.1 oz)   SpO2 96%   BMI 36.51 kg/m
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
## 2021-10-22 NOTE — PROGRESS NOTES
Addendum: 14:39- Writer spoke w/ nephew, Cordell. Cordell has not been able to speak to pt's sister to see if she would be available to stay w/ patient in her apartment. Writer stressed the importance of trying to connect w/ the sister ASAP so a plan can be arranged as pt is medically stable. Per Cordell, he reached out to a different sister in [place], [place] to see if she would be able to stay w/ pt.     Additional referrals sent to:    [facility] (Phone: [phone] Fax: [phone])- unable to review until Monday     [facility] (Main Phone: [phone] Admissions phone: [phone] Fax: [phone])- may have bed availability, will assess when referral is received, needs $10,000 down     [facility] (Admissions phone [phone]/ [facility] [phone]/ Fax: [phone])- left [facility] (Main phone: [phone] Fax: [phone])- left [facility] (Admissions Phone: [phone] Main Phone: [phone] Fax: [phone])- left [facility] (p- [phone] Fax [phone])- no beds available     [facility] (phone: [phone] Fax: [phone])- not accepting admissions d/t staffing     [facility] (Admission phone: [phone] Main phone: [phone] Fax: [phone])        Care Management Initial Consult    General Information  Assessment completed with: Patient, Family, Cordell, nephew   Type of CM/SW Visit: Initial Assessment    Primary Care Provider verified and updated as needed: Yes   Readmission within the last 30 days: no previous admission in last 30 days   Reason for Consult: discharge planning    Communication Assessment  Patient's communication style: spoken language (English or Bilingual)    Hearing Difficulty or Deaf: no   Wear Glasses or Blind: yes    Cognitive  Cognitive/Neuro/Behavioral: .WDL except  Level of Consciousness: confused  Arousal Level: opens  eyes spontaneously  Orientation: disoriented to, time, situation  Mood/Behavior: calm, cooperative  Best Language: 0 - No aphasia  Speech: clear, logical    Living Environment:   People in home: alone     Current living Arrangements: apartment      Able to return to prior arrangements: other (see comments) (Yes, w/ 24/7 supervision )       Family/Social Support:  Care provided by: self, other (see comments) (Nephew, Cordell )  Provides care for: no one  Marital Status:   Other (specify) (Nephew )          Description of Support System: Supportive, Involved    Support Assessment: Lacks necessary supervision and assistance    Current Resources:   Patient receiving home care services: No     Community Resources: None  Equipment currently used at home:    Supplies currently used at home: None    Employment/Financial:  Employment Status: retired            Lifestyle & Psychosocial Needs:  Social Determinants of Health     Tobacco Use:      Smoking Tobacco Use:      Smokeless Tobacco Use:    Alcohol Use:      Frequency of Alcohol Consumption:      Average Number of Drinks:      Frequency of Binge Drinking:    Financial Resource Strain:      Difficulty of Paying Living Expenses:    Food Insecurity:      Worried About Running Out of Food in the Last Year:      Ran Out of Food in the Last Year:    Transportation Needs:      Lack of Transportation (Medical):      Lack of Transportation (Non-Medical):    Physical Activity:      Days of Exercise per Week:      Minutes of Exercise per Session:    Stress:      Feeling of Stress :    Social Connections:      Frequency of Communication with Friends and Family:      Frequency of Social Gatherings with Friends and Family:      Attends [employer] Services:      Active Member of Clubs or Organizations:      Attends Club or Organization Meetings:      Marital Status:    Intimate Partner Violence:      Fear of Current or Ex-Partner:      Emotionally Abused:      Physically Abused:       "Sexually Abused:    Depression:      PHQ-2 Score:    Housing Stability:      Unable to Pay for Housing in the Last Year:      Number of Places Lived in the Last Year:      Unstable Housing in the Last Year:        Functional Status:  Prior to admission patient needed assistance:   Dependent ADLs:: Independent  Dependent IADLs:: Independent  Assesssment of Functional Status: Has complex medical needs, requires placement in a facility, Needs assistance with handling finances, Needs assistance with meals, Needs assistance with medications, At functional baseline    Mental Health Status:  Mental Health Status: Past Concern  Mental Health Management:  (Last psych visit 2018? )    Chemical Dependency Status:  Chemical Dependency Status: No Current Concerns              Additional Information:    Writer met w/ patient via bedside and introduced self and role. Danielle was very pleasant toward writer. She is not a reliable historian. She appears A&Ox1 (self only). She was able to tell writer her , what season we are in and the president. She was unable to state what town or environment she is currently in and the date. She spoke about her  as if he was still living then moments later would tell writer \"he's been dead for 1 year.\" Per OT, pt scored 2/30 on her SLUMS.     Per Cordell, nephew he has been her main caregiver for several years. Danielle has been staying with Cordell for the last few days but at baseline lives alone. Cordell believes that she has not been taking her medications as prescribed. Cordell has been in the process of looking into JO's for pt. He has made contact w/ [facility] for assistance w/ placement and finances. Per Cordell, no one has POA and/or HCD.     Writegavin and Cordell discussed the plan for discharge. Cordell states that pt's sister may be able to stay w/ patient  in her apartment until long term placement is secured at an [facility]. Writer explained that home care services could be ordered to assist w/ pt in the " home, especially w/ her medications. In addition, writer could have a SW follow up and make a referral to the [facility] for community resources/placement. Cordell was going to try to get ahold of pt's sister to see if she could be available. Cordell is not able to provide 24/7 support/supervision to pt as he currently works outside of the home and has 3 disabled dtr's that he cares for.     Writer will make referrals to LTC. Per Cordell, she does have the money for approximately 2 months of LTC.     [facility] (Main: [phone] Admissions: [phone] Fax: [phone]). Writer will ask that [facility] admissions rep review for all [facility] facilities.    No openings at [facility] or [place], will assess for other locations.     [facility] Phone (Main Phone:[phone] Admissions Phone:[phone] Fax: [phone])- no openings until 1-2 weeks     [facility] (Phone: [phone] Fax: [phone])- No beds     [facility] Phone: (Admissions: [phone] RN Report: [phone] Fax: [phone])- No beds     [facility] (Admissions Phone: [phone] Main Phone: [phone] Fax: [phone])- left [facility] (Phone: [phone] Fax: [phone])- no answer and unable to leave a     Addendum: 13:46- additional referrals sent to:    [facility] (Main Phone: [phone] Admission Phone: [phone] Fax: [phone])    [facility] (Phone: [phone] Fax: [phone])- not accepting admissions d/t COVID outbreak     [place] (Main Phone: [phone] Admissions Phone: [phone] Fax: [phone])    [facility]- writer left KEZIA rivera/ Erna @ [phone]       PLAN: LTC vs home w/ 24/7 supervision and home care RN for medication management/set up. Family plans to make arrangements for JO placement.    Referral  placed for home care services w/ [facility] (Phone: [phone]) for RN (medication management/set-up) and SW (placement and financial assistance).     Referral sent to Tania Lin, financial counselor for possible involvement to assist w/ MA application if needed.       YULIA Jenkins  Care Management, [facility]  [phone]

## 2021-10-22 NOTE — PLAN OF CARE
A&Ox4, forgetful. Mobility: SBA walker/GB. Regular diet. Voiding adequetely. LBM unknown. PIV SL. Denies pain. Rash in R groin, nystatin powder applied.    Hypertensive SBP 140s-160s. Afebrile    Called nephew for med rec, left a message, no resonse.

## 2021-10-22 NOTE — PROGRESS NOTES
[facility] Internal Medicine Progress Note     Date of Service (when I saw the patient): 10/22/2021    REASON FOR ADMISSION / INTERVAL HISTORY:  Danielle Diaz is a 81 year old female admitted on 10/21/2021. She has history of breast cancer, hypertension, DVT, COPD, dementia, depression and anxiety. Admitted  concerns of confusion. Still seems confused    ASSESSMENT/PLAN:     Confusion  Likely due to progressive dementia. Acute encephalopathy ruled out. No sn/sx of any infection.   Likely baseline. Needs OP f/up with neurology.     Vascular Dementia  Per chart review diagnosed with mild cognitive impairment in 2014 with noted multiple areas of stroke which was suggestive of possible vascular dementia. [place] cognitive assessment in 2018 was 16/30.  Last PCP note in 2018 suggest she was on donepezil.  Scored 2/30 in slums.  -Verify home medications    History of vitamin B12 deficiency  Noted in chart, managed with vitamins when diagnosed. labs nl.      OCD  Depression and anxiety  Insomnia  Previously followed with psychiatry, last visit on file 10/2018, visit reviewed: history of psych hospitalization in 2018, she was last managed on venlafaxine 37.5, mirtazapine 15 mg and quetiapine 100 mg daily.  Patient's mood is reportedly stable, affect and mood appear normal on admission.  -Verify home medications     Hypertension  Previously diagnosed though unclear current medications.  Appears she was on metoprolol in the past, last known dose in 2018 was 50 mg of XL daily.  Blood pressures reviewed, mildly elevated in the ED up to 150/92.   -Verify home medications  -Monitor blood pressure, prn hydralazine available if SBP > 180 or DBP >120     Breast Cancer with history of metastasis to bone  Follows with Dr. Yun in [place] [facility]. Appears she underwent biopsy of T4 lytic lesion 1/2020.  She reports she is overdue for follow-up and has an appointment in 1 week.  She states she previously had a surgery and underwent  chemotherapy, though is not on any current treatment for this.  She states she believes she needs another surgery.  F/u OP      History of DVT   Elevated INR, history of warfarin use  Occurred 11/2019, started on warfarin at that time.  Unclear if she is currently taking this though INR is currently elevated at 2.31.  Verify med rec       COPD  Lungs without wheeze on admission.  Noted in history. No PFTs on file. Patient was previously on Symbicort and Singulair PCP note in 2018.  -Verify home medications     COVID Status  - COVID testing negative on admission 10/21/21  - COVID vaccination completed, Pfizer 4/12/21 and 5/3/21    DISPO  SW on case. Home with family vs LTCF       GRAZYNA COCHRAN MD   Pg [phone]    DVT Prhylaxis: Low Risk/Ambulatory with no VTE prophylaxis indicated  Code Status: No CPR- Pre-arrest intubation OK    ROS:  As described in A/P and Exam.  Otherwise ALL are  negative.    PHYSICAL EXAM:  All vitals have been reviewed    Blood pressure (!) 144/80, pulse 71, temperature 98.4  F (36.9  C), temperature source Oral, resp. rate 18, weight 93.5 kg (206 lb 2.1 oz), SpO2 96 %.    No intake/output data recorded.    GENERAL APPEARANCE: healthy, alert and no distress  EYES: conjunctiva clear, eyes grossly normal  HENT: external ears and nose normal   RESP: lungs clear to auscultation - no rales, rhonchi or wheezes  CV: regular rate and rhythm, normal S1 S2, no S3 or S4 and no murmur, click or rub   ABDOMEN: soft, nontender, no HSM or masses and bowel sounds normal  MS: no clubbing, cyanosis; no edema  SKIN: clear without significant rashes or lesions  NEURO: -non-focal moves all 4 extr    ROUTINE  LABS (Last four results)  CMP  Recent Labs   Lab 10/22/21  0452 10/21/21  1901    143   POTASSIUM 3.7 3.5   CHLORIDE 112* 111*   CO2 27 25   ANIONGAP 3 7   GLC 82 100*   BUN 12 17   CR 0.64 0.72   GFRESTIMATED 84 79   CHERIE 8.2* 8.9   PROTTOTAL  --  7.1   ALBUMIN  --  3.3*   BILITOTAL  --  0.9   ALKPHOS   --  99   AST  --  39   ALT  --  32     CBC  Recent Labs   Lab 10/22/21  0452 10/21/21  1901   WBC 4.2 4.1   RBC 3.80 4.13   HGB 11.5* 12.7   HCT 35.2 39.4   MCV 93 95   MCH 30.3 30.8   MCHC 32.7 32.2   RDW 13.6 13.9   * 159     INR  Recent Labs   Lab 10/21/21  1933   INR 2.31*     Arterial Blood GasNo lab results found in last 7 days.    Recent Results (from the past 24 hour(s))   Head CT w/o contrast    Narrative    EXAM: CT HEAD W/O CONTRAST  LOCATION: [facility]  DATE/TIME: 10/21/2021 7:43 PM    INDICATION: Altered mental status.  COMPARISON: Head CT 09/01/2010.  TECHNIQUE: Routine CT Head without IV contrast. Multiplanar reformats. Dose reduction techniques were used.    FINDINGS:  INTRACRANIAL CONTENTS: No intracranial hemorrhage, extraaxial collection, or mass effect.  No CT evidence of acute infarct. Moderate presumed chronic small vessel ischemic changes. Mild generalized volume loss. No hydrocephalus.     VISUALIZED ORBITS/SINUSES/MASTOIDS: No intraorbital abnormality. No paranasal sinus mucosal disease. No middle ear or mastoid effusion.    BONES/SOFT TISSUES: No acute abnormality.      Impression    IMPRESSION:  1.  No acute intracranial process.  2.  Mild diffuse parenchymal volume loss and moderate white matter changes most likely due to chronic microvascular ischemic disease.

## 2021-10-23 ENCOUNTER — APPOINTMENT (OUTPATIENT)
Dept: ULTRASOUND IMAGING | Facility: CLINIC | Age: 81
End: 2021-10-23
Attending: INTERNAL MEDICINE
Payer: COMMERCIAL

## 2021-10-23 PROCEDURE — 250N000013 HC RX MED GY IP 250 OP 250 PS 637: Performed by: INTERNAL MEDICINE

## 2021-10-23 PROCEDURE — G0378 HOSPITAL OBSERVATION PER HR: HCPCS

## 2021-10-23 PROCEDURE — 99207 PR CDG-CODE CATEGORY CHANGED: CPT | Performed by: INTERNAL MEDICINE

## 2021-10-23 PROCEDURE — 99225 PR SUBSEQUENT OBSERVATION CARE,LEVEL II: CPT | Performed by: INTERNAL MEDICINE

## 2021-10-23 PROCEDURE — 93971 EXTREMITY STUDY: CPT | Mod: RT

## 2021-10-23 RX ORDER — MIRTAZAPINE 15 MG/1
15 TABLET, FILM COATED ORAL
Status: DISCONTINUED | OUTPATIENT
Start: 2021-10-23 | End: 2021-10-25 | Stop reason: HOSPADM

## 2021-10-23 RX ORDER — QUETIAPINE FUMARATE 100 MG/1
100 TABLET, FILM COATED ORAL AT BEDTIME
Status: DISCONTINUED | OUTPATIENT
Start: 2021-10-23 | End: 2021-10-25 | Stop reason: HOSPADM

## 2021-10-23 RX ORDER — MONTELUKAST SODIUM 10 MG/1
10 TABLET ORAL AT BEDTIME
Status: DISCONTINUED | OUTPATIENT
Start: 2021-10-23 | End: 2021-10-25 | Stop reason: HOSPADM

## 2021-10-23 RX ORDER — DONEPEZIL HYDROCHLORIDE 10 MG/1
10 TABLET, FILM COATED ORAL AT BEDTIME
Status: DISCONTINUED | OUTPATIENT
Start: 2021-10-23 | End: 2021-10-25 | Stop reason: HOSPADM

## 2021-10-23 RX ORDER — ATORVASTATIN CALCIUM 20 MG/1
20 TABLET, FILM COATED ORAL DAILY
Status: DISCONTINUED | OUTPATIENT
Start: 2021-10-23 | End: 2021-10-25 | Stop reason: HOSPADM

## 2021-10-23 RX ORDER — VENLAFAXINE HYDROCHLORIDE 37.5 MG/1
37.5 TABLET, EXTENDED RELEASE ORAL
Status: DISCONTINUED | OUTPATIENT
Start: 2021-10-23 | End: 2021-10-23 | Stop reason: CLARIF

## 2021-10-23 RX ORDER — VENLAFAXINE HYDROCHLORIDE 37.5 MG/1
37.5 CAPSULE, EXTENDED RELEASE ORAL
Status: DISCONTINUED | OUTPATIENT
Start: 2021-10-23 | End: 2021-10-25 | Stop reason: HOSPADM

## 2021-10-23 RX ORDER — BUDESONIDE AND FORMOTEROL FUMARATE DIHYDRATE 160; 4.5 UG/1; UG/1
2 AEROSOL RESPIRATORY (INHALATION)
Status: DISCONTINUED | OUTPATIENT
Start: 2021-10-23 | End: 2021-10-23 | Stop reason: CLARIF

## 2021-10-23 RX ORDER — METOPROLOL SUCCINATE 25 MG/1
25 TABLET, EXTENDED RELEASE ORAL DAILY
Status: DISCONTINUED | OUTPATIENT
Start: 2021-10-23 | End: 2021-10-25 | Stop reason: HOSPADM

## 2021-10-23 RX ORDER — ARIPIPRAZOLE 2 MG/1
2 TABLET ORAL DAILY
Status: DISCONTINUED | OUTPATIENT
Start: 2021-10-23 | End: 2021-10-25 | Stop reason: HOSPADM

## 2021-10-23 RX ORDER — BENZTROPINE MESYLATE 0.5 MG/1
0.5 TABLET ORAL AT BEDTIME
Status: DISCONTINUED | OUTPATIENT
Start: 2021-10-23 | End: 2021-10-25 | Stop reason: HOSPADM

## 2021-10-23 RX ORDER — PANTOPRAZOLE SODIUM 20 MG/1
40 TABLET, DELAYED RELEASE ORAL 2 TIMES DAILY
Status: DISCONTINUED | OUTPATIENT
Start: 2021-10-23 | End: 2021-10-25 | Stop reason: HOSPADM

## 2021-10-23 RX ADMIN — VENLAFAXINE HYDROCHLORIDE 37.5 MG: 37.5 CAPSULE, EXTENDED RELEASE ORAL at 10:24

## 2021-10-23 RX ADMIN — MICONAZOLE NITRATE: 20 POWDER TOPICAL at 07:58

## 2021-10-23 RX ADMIN — MICONAZOLE NITRATE: 20 POWDER TOPICAL at 19:40

## 2021-10-23 RX ADMIN — BENZTROPINE MESYLATE 0.5 MG: 0.5 TABLET ORAL at 22:08

## 2021-10-23 RX ADMIN — ATORVASTATIN CALCIUM 20 MG: 20 TABLET, FILM COATED ORAL at 10:24

## 2021-10-23 RX ADMIN — PANTOPRAZOLE SODIUM 40 MG: 20 TABLET, DELAYED RELEASE ORAL at 19:40

## 2021-10-23 RX ADMIN — DONEPEZIL HYDROCHLORIDE 10 MG: 10 TABLET, FILM COATED ORAL at 22:07

## 2021-10-23 RX ADMIN — FLUTICASONE FUROATE AND VILANTEROL TRIFENATATE 1 PUFF: 200; 25 POWDER RESPIRATORY (INHALATION) at 17:34

## 2021-10-23 RX ADMIN — PANTOPRAZOLE SODIUM 40 MG: 20 TABLET, DELAYED RELEASE ORAL at 10:24

## 2021-10-23 RX ADMIN — METOPROLOL SUCCINATE 25 MG: 25 TABLET, FILM COATED, EXTENDED RELEASE ORAL at 10:24

## 2021-10-23 RX ADMIN — ARIPIPRAZOLE 2 MG: 2 TABLET ORAL at 17:35

## 2021-10-23 RX ADMIN — MONTELUKAST 10 MG: 10 TABLET, FILM COATED ORAL at 22:08

## 2021-10-23 RX ADMIN — QUETIAPINE FUMARATE 100 MG: 100 TABLET ORAL at 22:08

## 2021-10-23 NOTE — PLAN OF CARE
"A/O to self, micky pain, refused to eat dinner. Patient started picking apart a \"chucks\" and became fidgety, patient was given towels to fold, stopped the picking. Takes pills whole with water. /79 (BP Location: Left arm)   Pulse 85   Temp 97.7  F (36.5  C) (Oral)   Resp 16   Wt 93.5 kg (206 lb 2.1 oz)   SpO2 97%   BMI 36.51 kg/m    "

## 2021-10-23 NOTE — PROGRESS NOTES
Care Management Follow Up    Length of Stay (days): 0    Expected Discharge Date: 10/25/21  Expected Time of Departure: 10/25/21  Concerns to be Addressed: all concerns addressed in this encounter     Patient plan of care discussed at interdisciplinary rounds: Yes    Anticipated Discharge Disposition: Long Term Care     Anticipated Discharge Services: Transportation Services  Anticipated Discharge DME: Wheelchair    Patient/family educated on Medicare website which has current facility and service quality ratings: yes  Education Provided on the Discharge Plan:  yes  Patient/Family in Agreement with the Plan: yes    Referrals Placed by CM/SW: Transportation, Post Acute Facilities  Private pay costs discussed: transportation costs    Additional Information:    The Patient has been accepted at [facility] (Admissions Phone: [phone] Main Phone: [phone] Fax: [phone]) on 10/25.  PAS #[id number].  Confirmed with Jonn Yanez Manager [phone] regarding the discharge plan. A $2000 down payment will be required upon admission.  The Pt's nephewCordell will give the check to the Charge RN/RODRIGUEZ on 10/23 to send with the Pt.  Transportation will be provided by [employer] via wheelchair at 1330 on 10/25.  Per [facility], 1330 is the earliest available ride.     Discussed the discharge plan with the Pt's nephewCordell as the Pt is confused and Jonn Yanez.    Plan:  [facility] 10/25 at 1330 via [employer].      Xiao Bolton RN

## 2021-10-23 NOTE — PROGRESS NOTES
[facility] Internal Medicine Progress Note     Date of Service (when I saw the patient): 10/23/2021    REASON FOR ADMISSION / INTERVAL HISTORY:  Danielle Diaz is a 81 year old female admitted on 10/21/2021. She has history of breast cancer, hypertension, DVT, COPD, dementia, depression and anxiety. Admitted  concerns of confusion. Still  Confused-likely baseline dementia    ASSESSMENT/PLAN:     Confusion  Likely due to progressive dementia. Acute encephalopathy ruled out. No sn/sx of any infection.   Likely baseline. Needs OP f/up with neurology.     Vascular Dementia  Per chart review diagnosed with mild cognitive impairment in 2014 with noted multiple areas of stroke which was suggestive of possible vascular dementia. [place] cognitive assessment in 2018 was 16/30.  Last PCP note in 2018 suggest she was on donepezil.  Scored 2/30 in slums.  -will resume aricept. Needs to f/u neurology    History of vitamin B12 deficiency  Noted in chart, managed with vitamins when diagnosed. labs nl.      OCD  Depression and anxiety  Insomnia  Previously followed with psychiatry, last visit on file 10/2018, visit reviewed: history of psych hospitalization in 2018, she was last managed on venlafaxine 37.5, mirtazapine 15 mg and quetiapine 100 mg daily.  Patient's mood is reportedly stable, affect and mood appear normal on admission.  -will resume seroquel/ effexor/ remeron. Needs OP f/u.     Hypertension  Previously diagnosed though unclear current medications.  Appears she was on metoprolol in the past, last known dose in 2018 was 50 mg of XL daily.  Blood pressures reviewed, mildly elevated in the ED up to 150/92.   -will resume metoprolol     Breast Cancer with history of metastasis to bone  Follows with Dr. Yun in [place] [facility]. Appears she underwent biopsy of T4 lytic lesion 1/2020.  She reports she is overdue for follow-up and has an appointment in 1 week.  She states she previously had a surgery and underwent  chemotherapy, though is not on any current treatment for this.  She states she believes she needs another surgery.  F/u OP      History of DVT   Elevated INR, history of warfarin use  Occurred 11/2019, started on warfarin at that time.  Unclear if she is currently taking this though INR is currently elevated at 2.31.   Unable to verify if pt was taking coumadin or not. DVT was in 11/19.   -will ck US RLE -if negative, will not anticoagulate.       COPD  Lungs without wheeze on admission.  Noted in history. No PFTs on file. Patient was previously on Symbicort and Singulair PCP note in 2018.  -will resume meds     COVID Status  - COVID testing negative on admission 10/21/21  - COVID vaccination completed, Pfizer 4/12/21 and 5/3/21    DISPO  SW on case. LTCF on monday       GRAZYNA COCHRAN MD   Pg [phone]    DVT Prhylaxis: Low Risk/Ambulatory with no VTE prophylaxis indicated  Code Status: No CPR- Pre-arrest intubation OK    ROS:  As described in A/P and Exam.  Otherwise ALL are  negative.    PHYSICAL EXAM:  All vitals have been reviewed    Blood pressure (!) 155/66, pulse 69, temperature 98.1  F (36.7  C), temperature source Oral, resp. rate 18, weight 93.5 kg (206 lb 2.1 oz), SpO2 97 %.    No intake/output data recorded.    GENERAL APPEARANCE: healthy, alert and no distress  EYES: conjunctiva clear, eyes grossly normal  HENT: external ears and nose normal   RESP: lungs clear to auscultation - no rales, rhonchi or wheezes  CV: regular rate and rhythm, normal S1 S2, no S3 or S4 and no murmur, click or rub   ABDOMEN: soft, nontender, no HSM or masses and bowel sounds normal  MS: no clubbing, cyanosis; no edema  SKIN: clear without significant rashes or lesions  NEURO: -non-focal moves all 4 extr    ROUTINE  LABS (Last four results)  CMP  Recent Labs   Lab 10/22/21  0452 10/21/21  1901    143   POTASSIUM 3.7 3.5   CHLORIDE 112* 111*   CO2 27 25   ANIONGAP 3 7   GLC 82 100*   BUN 12 17   CR 0.64 0.72   GFRESTIMATED  84 79   CHERIE 8.2* 8.9   PROTTOTAL  --  7.1   ALBUMIN  --  3.3*   BILITOTAL  --  0.9   ALKPHOS  --  99   AST  --  39   ALT  --  32     CBC  Recent Labs   Lab 10/22/21  0452 10/21/21  1901   WBC 4.2 4.1   RBC 3.80 4.13   HGB 11.5* 12.7   HCT 35.2 39.4   MCV 93 95   MCH 30.3 30.8   MCHC 32.7 32.2   RDW 13.6 13.9   * 159     INR  Recent Labs   Lab 10/21/21  1933   INR 2.31*     Arterial Blood GasNo lab results found in last 7 days.    No results found for this or any previous visit (from the past 24 hour(s)).

## 2021-10-23 NOTE — UTILIZATION REVIEW
Concurrent stay review; Secondary Review Determination     Under the authority of the Utilization Management Committee, the utilization review process indicated a secondary review on the above patient.  The review outcome is based on review of the medical records, discussions with staff, and applying clinical experience noted on the date of the review.          (x) Observation Status Appropriate - Concurrent stay review    RATIONALE FOR DETERMINATION   82 yo female with hypertension, COPD, dementia who was brought to the hospital by a family member for confusion. Extensive evaluation for any reversible cause was done and no metabolic or infectious etiologies were found. Head CT negative for stroke. This presentation appears to be a progression in her dementia such that she can no longer be at home. Medically stable and awaiting placement to care facility pending bed availability.     Patient is clinically improving and there is no clear indication to change patient's status to inpatient. The severity of illness, intensity of service provided, expected LOS and risk for adverse outcome make the care appropriate for observation.    This document was produced using voice recognition software     The information on this document is developed by the utilization review team in order for the business office to ensure compliance.  This only denotes the appropriateness of proper admission status and does not reflect the quality of care rendered.         The definitions of Inpatient Status and Observation Status used in making the determination above are those provided in the CMS Coverage Manual, Chapter 1 and Chapter 6, section 70.4.      Sincerely,   Eden Rios MD  Utilization Review  Physician Advisor  [facility].

## 2021-10-23 NOTE — PLAN OF CARE
Patient is oriented to self only. Ambulates with SBA and gait/walker. Denying any pain. VS stable on room air. Clear lungs. Reddened groin; antifungal powder applied after bed bath. Skin between buttocks is also reddened, barrier cream applied. +1 bilateral lower extremity edema. BP (!) 155/66 (BP Location: Left arm)   Pulse 69   Temp 98.1  F (36.7  C) (Oral)   Resp 18   Wt 93.5 kg (206 lb 2.1 oz)   SpO2 97%   BMI 36.51 kg/m

## 2021-10-23 NOTE — PROGRESS NOTES
Patient removed her peripheral IV from her right arm. Telemedicine updated and confirmed patient does not need a new IV placed at this time.

## 2021-10-24 PROCEDURE — 99225 PR SUBSEQUENT OBSERVATION CARE,LEVEL II: CPT | Performed by: INTERNAL MEDICINE

## 2021-10-24 PROCEDURE — G0378 HOSPITAL OBSERVATION PER HR: HCPCS

## 2021-10-24 PROCEDURE — 250N000013 HC RX MED GY IP 250 OP 250 PS 637: Performed by: INTERNAL MEDICINE

## 2021-10-24 PROCEDURE — 99207 PR CDG-CODE CATEGORY CHANGED: CPT | Performed by: INTERNAL MEDICINE

## 2021-10-24 RX ADMIN — ATORVASTATIN CALCIUM 20 MG: 20 TABLET, FILM COATED ORAL at 08:58

## 2021-10-24 RX ADMIN — MICONAZOLE NITRATE: 20 POWDER TOPICAL at 20:07

## 2021-10-24 RX ADMIN — QUETIAPINE FUMARATE 100 MG: 100 TABLET ORAL at 20:22

## 2021-10-24 RX ADMIN — METOPROLOL SUCCINATE 25 MG: 25 TABLET, FILM COATED, EXTENDED RELEASE ORAL at 08:58

## 2021-10-24 RX ADMIN — BENZTROPINE MESYLATE 0.5 MG: 0.5 TABLET ORAL at 20:48

## 2021-10-24 RX ADMIN — FLUTICASONE FUROATE AND VILANTEROL TRIFENATATE 1 PUFF: 200; 25 POWDER RESPIRATORY (INHALATION) at 08:58

## 2021-10-24 RX ADMIN — ARIPIPRAZOLE 2 MG: 2 TABLET ORAL at 08:58

## 2021-10-24 RX ADMIN — MICONAZOLE NITRATE: 20 POWDER TOPICAL at 08:58

## 2021-10-24 RX ADMIN — VENLAFAXINE HYDROCHLORIDE 37.5 MG: 37.5 CAPSULE, EXTENDED RELEASE ORAL at 08:58

## 2021-10-24 RX ADMIN — DONEPEZIL HYDROCHLORIDE 10 MG: 10 TABLET, FILM COATED ORAL at 20:22

## 2021-10-24 RX ADMIN — PANTOPRAZOLE SODIUM 40 MG: 20 TABLET, DELAYED RELEASE ORAL at 08:58

## 2021-10-24 RX ADMIN — PANTOPRAZOLE SODIUM 40 MG: 20 TABLET, DELAYED RELEASE ORAL at 20:22

## 2021-10-24 RX ADMIN — MONTELUKAST 10 MG: 10 TABLET, FILM COATED ORAL at 20:23

## 2021-10-24 NOTE — PLAN OF CARE
Patient very confused.  Oriented to self only.  Does not use call light.  Impulsive.  Gets out of bed without assistance.  Bed alarm activated for patient safety.  Is cooperative and follows commands.  Reorientation fails. Patient does not know where she is.  Uses walker to ambulate to bathroom with stand by assist and gait belt.  Her needs should be anticipated by staff.

## 2021-10-24 NOTE — PROGRESS NOTES
[facility] Internal Medicine Progress Note     Date of Service (when I saw the patient): 10/24/2021    REASON FOR ADMISSION / INTERVAL HISTORY:  Danielle Diaz is a 81 year old female admitted on 10/21/2021. She has history of breast cancer, hypertension, DVT, COPD, dementia, depression and anxiety. Admitted for  concerns of confusion. Likely aseline dementia    ASSESSMENT/PLAN:     Confusion  Likely due to progressive dementia. Acute encephalopathy ruled out. No sn/sx of any infection.   Likely baseline. Needs OP f/up with neurology.     Vascular Dementia  Per chart review diagnosed with mild cognitive impairment in 2014 with noted multiple areas of stroke which was suggestive of possible vascular dementia. [place] cognitive assessment in 2018 was 16/30.  Last PCP note in 2018 suggest she was on donepezil.  Scored 2/30 in slums.  Resumed  aricept. Needs to f/u neurology    History of vitamin B12 deficiency  Noted in chart, managed with vitamins when diagnosed. labs nl.      OCD  Depression and anxiety  Insomnia  Previously followed with psychiatry, last visit on file 10/2018, visit reviewed: history of psych hospitalization in 2018, she was last managed on venlafaxine 37.5, mirtazapine 15 mg and quetiapine 100 mg daily.  Patient's mood is reportedly stable, affect and mood appear normal on admission.  -resumed  seroquel/ effexor/ remeron. Needs OP f/u.     Hypertension  Previously diagnosed though unclear current medications.  Appears she was on metoprolol in the past, last known dose in 2018 was 50 mg of XL daily.  Blood pressures reviewed, mildly elevated in the ED up to 150/92.   Resumed  metoprolol     Breast Cancer with history of metastasis to bone  Follows with Dr. Yun in [place] [facility]. Appears she underwent biopsy of T4 lytic lesion 1/2020.  She reports she is overdue for follow-up and has an appointment in 1 week.  She states she previously had a surgery and underwent chemotherapy, though is not on  any current treatment for this.  She states she believes she needs another surgery.  F/u OP      History of DVT   Elevated INR, history of warfarin use  Occurred 11/2019, started on warfarin at that time.  Unclear if she is currently taking this though INR is currently elevated at 2.31.   Unable to verify if pt was taking coumadin or not. DVT was in 11/19. US RLE -negative  - will not anticoagulate.       COPD  Lungs without wheeze on admission.  Noted in history. No PFTs on file. Patient was previously on Symbicort and Singulair PCP note in 2018.  Resumed  meds     COVID Status  - COVID testing negative on admission 10/21/21  - COVID vaccination completed, Pfizer 4/12/21 and 5/3/21    DISPO  SW on case. LTCF on monday       GRAZYNA COCHRAN MD   Pg [phone]    DVT Prhylaxis: Low Risk/Ambulatory with no VTE prophylaxis indicated  Code Status: No CPR- Pre-arrest intubation OK    ROS:  As described in A/P and Exam.  Otherwise ALL are  negative.    PHYSICAL EXAM:  All vitals have been reviewed    Blood pressure 125/71, pulse 64, temperature 97.6  F (36.4  C), temperature source Oral, resp. rate 16, weight 93.5 kg (206 lb 2.1 oz), SpO2 96 %.    I/O this shift:  In: 250 [P.O.:250]  Out: -     GENERAL APPEARANCE: healthy, alert and no distress  EYES: conjunctiva clear, eyes grossly normal  HENT: external ears and nose normal   RESP: lungs clear to auscultation - no rales, rhonchi or wheezes  CV: regular rate and rhythm, normal S1 S2, no S3 or S4 and no murmur, click or rub   ABDOMEN: soft, nontender, no HSM or masses and bowel sounds normal  MS: no clubbing, cyanosis; no edema  SKIN: clear without significant rashes or lesions  NEURO: -non-focal moves all 4 extr    ROUTINE  LABS (Last four results)  CMP  Recent Labs   Lab 10/22/21  0452 10/21/21  1901    143   POTASSIUM 3.7 3.5   CHLORIDE 112* 111*   CO2 27 25   ANIONGAP 3 7   GLC 82 100*   BUN 12 17   CR 0.64 0.72   GFRESTIMATED 84 79   CHERIE 8.2* 8.9   PROTTOTAL  --   7.1   ALBUMIN  --  3.3*   BILITOTAL  --  0.9   ALKPHOS  --  99   AST  --  39   ALT  --  32     CBC  Recent Labs   Lab 10/22/21  0452 10/21/21  1901   WBC 4.2 4.1   RBC 3.80 4.13   HGB 11.5* 12.7   HCT 35.2 39.4   MCV 93 95   MCH 30.3 30.8   MCHC 32.7 32.2   RDW 13.6 13.9   * 159     INR  Recent Labs   Lab 10/21/21  1933   INR 2.31*     Arterial Blood GasNo lab results found in last 7 days.    Recent Results (from the past 24 hour(s))   US Lower Extremity Venous Duplex Right    Narrative    EXAM: US LOWER EXTREMITY VENOUS DUPLEX RIGHT  LOCATION: [facility]  DATE/TIME: 10/23/2021 3:44 PM    INDICATION: Pain and swelling.  COMPARISON: None.  TECHNIQUE: Venous Duplex ultrasound of the right lower extremity with and without compression, augmentation and duplex. Color flow and spectral Doppler with waveform analysis performed.    FINDINGS: Exam includes the common femoral, femoral, popliteal, and contralateral common femoral veins as well as segmentally visualized deep calf veins and greater saphenous vein.     RIGHT: No deep vein thrombosis. No superficial thrombophlebitis.      Impression    IMPRESSION:  1.  No deep venous thrombosis in the right lower extremity. 5.2 x 2.5 x 1.6 cm right Baker's cyst.

## 2021-10-24 NOTE — PLAN OF CARE
Oriented to self only. Mobility: SBA w/ walker/GB. Tolerating regular diet. Voiding adequately. LBM 10/23. No IV. Denies pain. Red rash to R groin and blanchable redness on buttoks.    VSS. Afebrile

## 2021-10-25 ENCOUNTER — PATIENT OUTREACH (OUTPATIENT)
Dept: CARE COORDINATION | Facility: CLINIC | Age: 81
End: 2021-10-25

## 2021-10-25 VITALS
SYSTOLIC BLOOD PRESSURE: 120 MMHG | HEIGHT: 63 IN | TEMPERATURE: 97.8 F | OXYGEN SATURATION: 96 % | WEIGHT: 206.79 LBS | RESPIRATION RATE: 18 BRPM | HEART RATE: 65 BPM | DIASTOLIC BLOOD PRESSURE: 48 MMHG | BODY MASS INDEX: 36.64 KG/M2

## 2021-10-25 DIAGNOSIS — R41.0 ACUTE DELIRIUM: Primary | ICD-10-CM

## 2021-10-25 LAB — INR PPP: 1.26 (ref 0.85–1.15)

## 2021-10-25 PROCEDURE — G0008 ADMIN INFLUENZA VIRUS VAC: HCPCS | Performed by: INTERNAL MEDICINE

## 2021-10-25 PROCEDURE — 250N000011 HC RX IP 250 OP 636: Performed by: INTERNAL MEDICINE

## 2021-10-25 PROCEDURE — G0378 HOSPITAL OBSERVATION PER HR: HCPCS

## 2021-10-25 PROCEDURE — 250N000013 HC RX MED GY IP 250 OP 250 PS 637: Performed by: INTERNAL MEDICINE

## 2021-10-25 PROCEDURE — 99207 PR CDG-CODE CATEGORY CHANGED: CPT | Performed by: INTERNAL MEDICINE

## 2021-10-25 PROCEDURE — 36415 COLL VENOUS BLD VENIPUNCTURE: CPT | Performed by: INTERNAL MEDICINE

## 2021-10-25 PROCEDURE — 85610 PROTHROMBIN TIME: CPT | Performed by: INTERNAL MEDICINE

## 2021-10-25 PROCEDURE — 99217 PR OBSERVATION CARE DISCHARGE: CPT | Performed by: INTERNAL MEDICINE

## 2021-10-25 PROCEDURE — 90662 IIV NO PRSV INCREASED AG IM: CPT | Performed by: INTERNAL MEDICINE

## 2021-10-25 RX ORDER — MIRTAZAPINE 15 MG/1
15 TABLET, FILM COATED ORAL
COMMUNITY
Start: 2021-10-25

## 2021-10-25 RX ORDER — WARFARIN SODIUM 5 MG/1
TABLET ORAL
Qty: 1 TABLET | Refills: 0 | Status: SHIPPED | OUTPATIENT
Start: 2021-10-25 | End: 2021-10-25

## 2021-10-25 RX ORDER — ARIPIPRAZOLE 2 MG/1
2 TABLET ORAL DAILY
COMMUNITY
Start: 2021-10-26

## 2021-10-25 RX ORDER — VENLAFAXINE HYDROCHLORIDE 37.5 MG/1
37.5 CAPSULE, EXTENDED RELEASE ORAL
COMMUNITY
Start: 2021-10-26 | End: 2021-10-25

## 2021-10-25 RX ORDER — ACETAMINOPHEN 500 MG
500 TABLET ORAL EVERY 6 HOURS PRN
Qty: 1 TABLET | Refills: 0
Start: 2021-10-25

## 2021-10-25 RX ORDER — WARFARIN SODIUM 5 MG/1
TABLET ORAL
Qty: 1 TABLET | Refills: 0 | DISCHARGE
Start: 2021-10-25

## 2021-10-25 RX ORDER — VERAPAMIL HYDROCHLORIDE 120 MG/1
120 TABLET, FILM COATED, EXTENDED RELEASE ORAL AT BEDTIME
COMMUNITY
Start: 2021-10-25

## 2021-10-25 RX ORDER — WARFARIN SODIUM 7.5 MG/1
7.5 TABLET ORAL
Status: COMPLETED | OUTPATIENT
Start: 2021-10-25 | End: 2021-10-25

## 2021-10-25 RX ORDER — DONEPEZIL HYDROCHLORIDE 10 MG/1
10 TABLET, FILM COATED ORAL AT BEDTIME
COMMUNITY
Start: 2021-10-25 | End: 2021-10-25

## 2021-10-25 RX ADMIN — ATORVASTATIN CALCIUM 20 MG: 20 TABLET, FILM COATED ORAL at 08:07

## 2021-10-25 RX ADMIN — MICONAZOLE NITRATE: 20 POWDER TOPICAL at 08:15

## 2021-10-25 RX ADMIN — PANTOPRAZOLE SODIUM 40 MG: 20 TABLET, DELAYED RELEASE ORAL at 08:07

## 2021-10-25 RX ADMIN — ARIPIPRAZOLE 2 MG: 2 TABLET ORAL at 08:07

## 2021-10-25 RX ADMIN — INFLUENZA A VIRUS A/VICTORIA/2570/2019 IVR-215 (H1N1) ANTIGEN (FORMALDEHYDE INACTIVATED), INFLUENZA A VIRUS A/TASMANIA/503/2020 IVR-221 (H3N2) ANTIGEN (FORMALDEHYDE INACTIVATED), INFLUENZA B VIRUS B/PHUKET/3073/2013 ANTIGEN (FORMALDEHYDE INACTIVATED), AND INFLUENZA B VIRUS B/WASHINGTON/02/2019 ANTIGEN (FORMALDEHYDE INACTIVATED) 0.7 ML: 60; 60; 60; 60 INJECTION, SUSPENSION INTRAMUSCULAR at 13:08

## 2021-10-25 RX ADMIN — WARFARIN SODIUM 7.5 MG: 7.5 TABLET ORAL at 13:07

## 2021-10-25 RX ADMIN — VENLAFAXINE HYDROCHLORIDE 37.5 MG: 37.5 CAPSULE, EXTENDED RELEASE ORAL at 08:07

## 2021-10-25 RX ADMIN — FLUTICASONE FUROATE AND VILANTEROL TRIFENATATE 1 PUFF: 200; 25 POWDER RESPIRATORY (INHALATION) at 08:10

## 2021-10-25 RX ADMIN — METOPROLOL SUCCINATE 25 MG: 25 TABLET, FILM COATED, EXTENDED RELEASE ORAL at 08:08

## 2021-10-25 ASSESSMENT — ACTIVITIES OF DAILY LIVING (ADL): DEPENDENT_IADLS:: INDEPENDENT

## 2021-10-25 ASSESSMENT — MIFFLIN-ST. JEOR: SCORE: 1372.13

## 2021-10-25 NOTE — PROGRESS NOTES
"Patient alert to self, and knew that she was in the hospital. Stand-by assist with any activity. Vitals WNL tonight.     /44 (BP Location: Right arm)   Pulse 74   Temp 97.3  F (36.3  C) (Oral)   Resp 18   Ht 1.6 m (5' 3\")   Wt 93.8 kg (206 lb 12.7 oz)   SpO2 94%   BMI 36.63 kg/m      "

## 2021-10-25 NOTE — PLAN OF CARE
[facility] DISCHARGE NOTE    Patient discharged to long term care facility at 1:20 PM via wheel chair. Accompanied by other: [facility] staff. Discharge instructions reviewed with  nurse Bonilla at [facility] [phone] , opportunity offered to ask questions. Prescriptions sent with patient to fill . All belongings sent with patient.    Patient's valuables envelope with patient's home medications in it were sent with patient to [facility].  Discussed with nurse Bonilla to give to lenora Landa who will be there later today to bring patient's belongings.    Maria Luisa Ivory RN

## 2021-10-25 NOTE — PROGRESS NOTES
Care Management Discharge Note    Discharge Date: 10/25/2021    Discharge Disposition: Long Term Care    Discharge Services: Transportation Services    Discharge DME: Wheelchair    Discharge Transportation: agency    Private pay costs discussed: transportation costs, down payment for facility     PAS Confirmation Code: [id number]  Patient/family educated on Medicare website which has current facility and service quality ratings: yes    Education Provided on the Discharge Plan: yes   Persons Notified of Discharge Plans: Cordell Heaton   Patient/Family in Agreement with the Plan: yes    Handoff Referral Completed: Yes    Additional Information:    Patient has been accepted at [facility] (Admissions Phone: [phone] Main Phone: [phone] Fax: [phone]) for admission today. lenora Landa did provide charge RN/HUC a check for $2,000 for a down payment to facility. Check to be sent w/ discharge p/w. Writer left a  w/ [facility] mar[facility] to confirm the above.     Writer confirmed the above w/ Cordell.     PAS #[id number].      Plan:  [facility] (Admissions Phone: [phone] Main Phone: [phone] Fax: [phone]) 10/25 at 1330 via  [employer].       YULIA Jenkins  Care Management, [facility]  [phone]

## 2021-10-25 NOTE — DISCHARGE SUMMARY
[facility] Hospitalist Discharge Summary    Danielle Diaz MRN# [id number]   Age: 81 year old YOB: 1940     Date of Admission:  10/21/2021  Date of Discharge::  10/25/2021  Admitting Physician:  Jennifer Butler MD  Discharge Physician:  Jennifer Butler MD  Primary Physician: Rachana Givens Facility: N/A     Home clinic: Unknown          Admission Diagnoses:   Acute delirium [R41.0]          Discharge Diagnosis:     Principle diagnosis: vascular dementia  Secondary diagnoses:  Patient Active Problem List   Diagnosis     Clear cell adenocarcinoma, endometrium     Other emphysema (H)     Generalized anxiety disorder     Obsessive-compulsive disorder     Malignant neoplasm of female breast (H)     Intractable migraine     Sinusitis, chronic     Generalized osteoarthrosis, unspecified site     ESOPHAGEAL REFLUX     Generalized pain     BENIGN HYPERTENSION     Backache     Disorder of bone and cartilage     Family history of osteoporosis     Lumbago     COPD (chronic obstructive pulmonary disease) (H)     Osteopenia     Agoraphobia with panic disorder     Major depressive disorder, recurrent episode (H)     Myalgia and myositis     Severe persistent asthma     Constipation     Claustrophobia     Sleep apnea     Migraine headache     Incisional hernia     Major depressive disorder, single episode, severe (H)     HYPERLIPIDEMIA LDL GOAL <130     Chronic neck pain     Eczema     Advanced directives, counseling/discussion     Health Care Home     Acute delirium     Vascular dementia (H)     Vitamin B12 deficiency neuropathy (H)     Paroxysmal atrial fibrillation (H)     Moderate major depression (H)     Insomnia     Chronic diarrhea     Acute deep vein thrombosis (DVT) of popliteal vein of right lower extremity (H)     Allergic rhinitis          Brief History of Presenting Illness:   As per admit hx  Danielle Diaz is a 81 year old female who presents due to concerns of confusion.     The patient is able  "to tell me that she was brought to the hospital due to concerns of confusion.  On admission she is oriented to self, hospital (unsure which hospital states she thinks it may be in [place] or [place]), month and year, and president.  She initially tells me that her  was concerned about her being confused though she later tells me that her   about a year ago.  It was actually her nephew who brought her in to the hospital.  He is not available on admission and a voicemail was left though he has not called back.  History is therefore obtained from the emergency department who was able to talk with him directly.     Per the ED provider note: \"Nephew called patient on Tuesday and she seemed significantly confused and not making sense.  He was concerned that she was not taking her medications so he went to her house and brought her home with him.  She has been staying at his house for the last few days.  He reports she very confused, and is concerned that she is no longer able to care for herself and cannot be left alone.  She is no sleeping and nephew found her up during the night last night eating Kleenex and a bottle of Tums. Today he found her taking apart the computer keyboard. No known injury or fall. Nephew was checking her bag of pills and does not think she is taking them. He brings a bag of pills and is not sure what she is supposed to be on. \"     The patient tells me that she lives alone.  She is not able to tell me what medication she is on.  Her last evaluation by her primary was in 2018 per chart review, she tells me she has not seen her clinic in a long time.  She does follow with Dr. Yun with [facility].  She states that she last saw him about 3 months ago and has a follow-up next week though is somewhat vague in these details.     She states she has been otherwise feeling well.  She denies headache, fever, chills, myalgias, lightheadedness, dizziness, cough, congestion, " rhinorrhea, sore throat, shortness of breath, palpitations, chest pain, abdominal pain, nausea, vomiting, diarrhea, changes in urination.               Hospital Course:   Confusion  Likely due to progressive dementia. Acute encephalopathy ruled out. No sn/sx of any infection.   Likely baseline. Needs OP f/up with neurology.     Vascular Dementia  Per chart review diagnosed with mild cognitive impairment in 2014 with noted multiple areas of stroke which was suggestive of possible vascular dementia. [place] cognitive assessment in 2018 was 16/30.  Last PCP note in 2018 suggest she was on donepezil.  Scored 2/30 in slums.  Not on aricept anymore. Needs to f/u neurology     History of vitamin B12 deficiency  Noted in chart, managed with vitamins when diagnosed. labs nl.      OCD  Depression and anxiety  Insomnia  Previously followed with psychiatry, last visit on file 10/2018, visit reviewed: history of psych hospitalization in 2018, she was last managed on venlafaxine 37.5, mirtazapine 15 mg and quetiapine 100 mg daily.  Patient's mood is reportedly stable, affect and mood appear normal on admission.  -resumed  seroquel/  remeron/ prozac. Needs OP f/u with psychiatry. Pt on multiple meds, needs to be assessed by psychiatry.     Hypertension  Previously diagnosed though unclear current medications.  Appears she was on metoprolol in the past, last known dose in 2018 was 50 mg of XL daily.  Blood pressures reviewed, mildly elevated in the ED up to 150/92.   Resumed  metoprolol/ verapamil. Need to f/u OP PMD ASAP for med review.     Breast Cancer with history of metastasis to bone  Follows with Dr. Yun in [place] [facility]. Appears she underwent biopsy of T4 lytic lesion 1/2020.  She reports she is overdue for follow-up and has an appointment in 1 week.  She states she previously had a surgery and underwent chemotherapy, though is not on any current treatment for this.  She states she believes she needs another surgery.  F/u  OP with oncology. Still on femara     History of DVT / afib  Elevated INR, history of warfarin use  Occurred 11/2019, started on warfarin at that time.  Unclear if she is currently taking this though INR is currently elevated at 2.31. did have afib on EKG in 11/19 and 1/20. EKG this admit was SR. But pt could be in paroxysmal afib-hence will continue coumadin. US RLE -negative  - will continue coumadin. Needs to f/u with PMD/ cardiology.       COPD  Lungs without wheeze on admission.  Noted in history. No PFTs on file. Patient was previously on Symbicort and Singulair PCP note in 2018.  Resumed  meds     COVID Status  - COVID testing negative on admission 10/21/21  - COVID vaccination completed, Pfizer 4/12/21 and 5/3/21     DISPO  Pt on multiple meds. Needs to f/u PMD to re-assess meds and adjust as needed. Needs to f/u psychiatry/ neurology and oncology.          Procedures:   No procedures performed during this admission         Allergies:      Allergies   Allergen Reactions     Dilaudid [Hydromorphone Hcl] Nausea     Morphine [Morphine] Rash     and blister; morhopne in er and dev welts and blister.  Can take codeine well     Sulfa Drugs Nausea and Vomiting             Medications Prior to Admission:     Medications Prior to Admission   Medication Sig Dispense Refill Last Dose     albuterol (2.5 MG/3ML) 0.083% nebulizer solution Take 3 mLs by nebulization every 6 hours as needed for shortness of breath / dyspnea. 1 Box 3 Unknown at Unknown time     Albuterol Sulfate (VENTOLIN HFA) 108 (90 BASE) MCG/ACT AERS Inhale 2 puffs into the lungs every 4 hours as needed. FOR COUGH, 2 PUFFS 15 MINUTES BEFORE EXERCISE 1 Inhaler 0 Unknown at Unknown time     atorvastatin (LIPITOR) 20 MG tablet Take 20 mg by mouth daily        benztropine (COGENTIN) 0.5 MG tablet Take 0.5 mg by mouth At Bedtime    Unknown at Unknown time     letrozole (FEMARA) 2.5 MG tablet Take 2.5 mg by mouth daily   filled 10/2/2021     metoprolol  succinate ER (TOPROL-XL) 25 MG 24 hr tablet Take 25 mg by mouth daily   Unknown at Unknown time     montelukast (SINGULAIR) 10 MG tablet Take 10 mg by mouth At Bedtime.   Unknown at Unknown time     omeprazole (PRILOSEC) 40 MG DR capsule Take 40 mg by mouth daily   Unknown at Unknown time     QUEtiapine (SEROQUEL) 100 MG tablet Take 100 mg by mouth At Bedtime   Unknown at Unknown time     SUMAtriptan (IMITREX) 50 MG tablet Take 50 mg by mouth at onset of headache for migraine   Unknown at Unknown time     SYMBICORT 160-4.5 MCG/ACT IN AERO 2 puffs every 12 hours 2 Inhaler 11      warfarin ANTICOAGULANT (COUMADIN) 5 MG tablet Take 5 mg by mouth daily   Unknown at Unknown time     ** PATIENT ALERT ** Warning:  DO NOT EXCEED 4,000 MG OF ACETAMINOPHEN FROM ALL SOURCES IN 24 HOURS        CALCIUM + D OR 1200mg ORALLY DAILY        cetirizine (ZYRTEC) 10 MG tablet Take 1 tablet (10 mg) by mouth every evening 90 tablet 3 ?taking from 2015     FISH OIL 1000 MG OR CAPS 1 CAPSULE ORALLY DAILY        MUCINEX 600 MG OR TB12 1 TABLET ORALLY EVERY 12 HOURS AS NEEDED        MULTIVITAMIN OR 1 TABLET ORALLY DAILY        RELPAX 40 MG OR TABS ONE TABLET WITH ONSET OF MIGRAINE, MAY REPEAT ONCE AFTER 2 HOURS. DO NOT EXCEED 2 TABLETS IN 24 HOURS. 6  boxes 1yr      simvastatin (ZOCOR) 40 MG tablet Take 1 tablet by mouth At Bedtime. at bedtime. 90 tablet 3      TYLENOL EXTRA STRENGTH 500 MG OR TABS 3 TABLETS ORALLY EVERY 4 HOURS AS NEEDED   0      [DISCONTINUED] ARIPiprazole (ABILIFY) 2 MG tablet Take 2 mg by mouth   Unknown at Unknown time     [DISCONTINUED] DESONIDE 0.05 % EX CREA BID AS NEEDED/COPOUND WITH KETOCONAZOLE 2% 60 g 2      [DISCONTINUED] desoximetasone (TOPICORT) 0.25 % cream Apply sparingly to affected area twice daily for 14 days.  Do not apply to face. 100 g 1      [DISCONTINUED] desvenlafaxine (PRISTIQ) 50 MG 24 hr tablet Take 50 mg by mouth daily. 1 tab in am, per Dr. Mónica Herring,  in [place]        [DISCONTINUED]  FLUoxetine (PROZAC) 10 MG capsule Take 20 mg by mouth daily    Unknown at Unknown time     [DISCONTINUED] furosemide (LASIX) 20 MG tablet Take 20 mg by mouth daily   Unknown at Unknown time     [DISCONTINUED] mirtazapine (REMERON) 15 MG tablet Take 15 mg by mouth   Unknown at Unknown time     [DISCONTINUED] topiramate (TOPAMAX) 50 MG tablet Take  by mouth. Take 1/2 tab in am, and one at bedtime (please let me know if can't be cut), after 4 days, can go to 50 mg bid 60 tablet 5      [DISCONTINUED] verapamil ER (VERELAN) 120 MG 24 hr capsule Take 120 mg by mouth At Bedtime   Unknown at Unknown time             Discharge Medications:     Current Discharge Medication List      CONTINUE these medications which have CHANGED    Details   ARIPiprazole (ABILIFY) 2 MG tablet Take 1 tablet (2 mg) by mouth daily      mirtazapine (REMERON) 15 MG tablet Take 1 tablet (15 mg) by mouth nightly as needed (sleep)         CONTINUE these medications which have NOT CHANGED    Details   albuterol (2.5 MG/3ML) 0.083% nebulizer solution Take 3 mLs by nebulization every 6 hours as needed for shortness of breath / dyspnea.  Qty: 1 Box, Refills: 3    Associated Diagnoses: Severe persistent asthma      Albuterol Sulfate (VENTOLIN HFA) 108 (90 BASE) MCG/ACT AERS Inhale 2 puffs into the lungs every 4 hours as needed. FOR COUGH, 2 PUFFS 15 MINUTES BEFORE EXERCISE  Qty: 1 Inhaler, Refills: 0    Associated Diagnoses: Rhinitis, allergic, perennial; Severe persistent asthma      atorvastatin (LIPITOR) 20 MG tablet Take 20 mg by mouth daily      benztropine (COGENTIN) 0.5 MG tablet Take 0.5 mg by mouth At Bedtime       FLUoxetine (PROZAC) 20 MG capsule Take 1 capsule (20 mg) by mouth daily      letrozole (FEMARA) 2.5 MG tablet Take 2.5 mg by mouth daily      metoprolol succinate ER (TOPROL-XL) 25 MG 24 hr tablet Take 25 mg by mouth daily      montelukast (SINGULAIR) 10 MG tablet Take 10 mg by mouth At Bedtime.      omeprazole (PRILOSEC) 40 MG DR  capsule Take 40 mg by mouth daily      QUEtiapine (SEROQUEL) 100 MG tablet Take 100 mg by mouth At Bedtime      SUMAtriptan (IMITREX) 50 MG tablet Take 50 mg by mouth at onset of headache for migraine      SYMBICORT 160-4.5 MCG/ACT IN AERO 2 puffs every 12 hours  Qty: 2 Inhaler, Refills: 11    Comments: Patient will be faxing to [facility] [phone]  Associated Diagnoses: Severe persistent asthma; Rhinitis, allergic, perennial      verapamil ER (CALAN-SR) 120 MG CR tablet Take 1 tablet (120 mg) by mouth At Bedtime      warfarin ANTICOAGULANT (COUMADIN) 5 MG tablet Take 5 mg by mouth daily      ** PATIENT ALERT ** Warning:  DO NOT EXCEED 4,000 MG OF ACETAMINOPHEN FROM ALL SOURCES IN 24 HOURS      CALCIUM + D OR 1200mg ORALLY DAILY      cetirizine (ZYRTEC) 10 MG tablet Take 1 tablet (10 mg) by mouth every evening  Qty: 90 tablet, Refills: 3    Associated Diagnoses: Dermatitis      FISH OIL 1000 MG OR CAPS 1 CAPSULE ORALLY DAILY      MUCINEX 600 MG OR TB12 1 TABLET ORALLY EVERY 12 HOURS AS NEEDED      MULTIVITAMIN OR 1 TABLET ORALLY DAILY      RELPAX 40 MG OR TABS ONE TABLET WITH ONSET OF MIGRAINE, MAY REPEAT ONCE AFTER 2 HOURS. DO NOT EXCEED 2 TABLETS IN 24 HOURS.  Qty: 6  boxes, Refills: 1yr    Associated Diagnoses: Other forms of migraine, with intractable migraine, so stated, without mention of status migrainosus      simvastatin (ZOCOR) 40 MG tablet Take 1 tablet by mouth At Bedtime. at bedtime.  Qty: 90 tablet, Refills: 3    Associated Diagnoses: Hyperlipidemia LDL goal <130      TYLENOL EXTRA STRENGTH 500 MG OR TABS 3 TABLETS ORALLY EVERY 4 HOURS AS NEEDED   Refills: 0         STOP taking these medications       DESONIDE 0.05 % EX CREA Comments:   Reason for Stopping:         desoximetasone (TOPICORT) 0.25 % cream Comments:   Reason for Stopping:         desvenlafaxine (PRISTIQ) 50 MG 24 hr tablet Comments:   Reason for Stopping:         furosemide (LASIX) 20 MG tablet Comments:   Reason for Stopping:       "   topiramate (TOPAMAX) 50 MG tablet Comments:   Reason for Stopping:         verapamil ER (VERELAN) 120 MG 24 hr capsule Comments:   Reason for Stopping:                     Consultations:   No consultations were requested during this admission            Discharge Exam:   Blood pressure 120/48, pulse 65, temperature 97.8  F (36.6  C), temperature source Oral, resp. rate 18, height 1.6 m (5' 3\"), weight 93.8 kg (206 lb 12.7 oz), SpO2 96 %.  GENERAL APPEARANCE: healthy, alert and no distress  EYES: conjunctiva clear, eyes grossly normal  HENT: external ears and nose normal   NECK: supple, no masses or adenopathy  RESP: lungs clear to auscultation - no rales, rhonchi or wheezes  CV: regular rate and rhythm, normal S1 S2, no S3 or S4 and no murmur, click or rub   ABDOMEN: soft, nontender, no HSM or masses and bowel sounds normal  MS: no clubbing, cyanosis; no edema  SKIN: clear without significant rashes or lesions  NEURO: Normal strength and tone, sensory exam grossly normal, mentation intact and speech normal    Unresulted Labs Ordered in the Past 30 Days of this Admission     No orders found from 9/21/2021 to 10/22/2021.          No results found for this or any previous visit (from the past 24 hour(s)).         Pending Tests at Discharge:   None         Discharge Instructions and Follow-Up:     Discharge diet: Regular   Discharge activity: Activity as tolerated   Discharge follow-up: Follow up with primary care provider in 1 week  F/u psychiatry ASAP'  F/u neurology/ oncology in 2-3 weeks           Discharge Disposition:     Discharged to long-term care facility      Attestation:  I have reviewed today's vital signs, notes, medications, labs and imaging.    Time Spent on this Encounter   I, Jennifer Butler MD, personally saw the patient today and spent greater than 30 minutes discharging this patient.    Jennifer Butler MD       "

## 2021-10-25 NOTE — PHARMACY-ANTICOAGULATION SERVICE
Clinical Pharmacy - Warfarin Dosing Consult     Pharmacy has been consulted to manage this patient s warfarin therapy. Based on outside documentation, the patient's home warfarin regimen is 7.5 mg by mouth once weekly (on Sundays) and 5 mg six days weekly (the rest of the week).       INR   Date Value Ref Range Status   10/25/2021 1.26 (H) 0.85 - 1.15 Final   10/21/2021 2.31 (H) 0.85 - 1.15 Final       Recommend the patient receive the higher of her two normal documented home doses of warfarin today (7.5 mg) due the patient's warfarin having been held and based on the patient's INR today.  Pharmacy will monitor Danielle Diaz daily and order warfarin doses to achieve specified goal.      Please contact pharmacy as soon as possible if the warfarin needs to be held for a procedure or if the warfarin goals change.      Thank you,    Oliver Murray, R.Ph.

## 2021-10-25 NOTE — PHARMACY-ANTICOAGULATION SERVICE
Clinical Pharmacy- Warfarin Discharge Note  This patient is currently on warfarin. INR Goal per consult = 2-3  Expected length of therapy undetermined.           Anticoagulation Dose History     Recent Dosing and Labs Latest Ref Rng & Units 6/26/2006 4/21/2010 10/21/2021 10/25/2021    INR 0.85 - 1.15 0.91 0.97 2.31(H) 1.26(H)          Recommend discharging the patient on her documented home warfarin regimen of 7.5 mg by mouth once weekly (on Sundays) and 5 mg by mouth six days weekly (the rest of the week).    The patient should have an INR checked on 10/26/2021 or when directed by her care team.    Thank you, Oliver Murray, R.Ph.

## 2021-10-25 NOTE — PROGRESS NOTES
Clinic Care Coordination Contact  Ambulatory Care Coordination to Inpatient Care Management   Hand-In Communication    Date:  October 25, 2021  Name: Danielle Diaz is enrolled in Ambulatory Care Coordination program and I am the Lead Care Coordinator.  CC Contact Information: Epic [employer] + phone  Payor Source: Payor: [facility] / Plan: [facility] MEDICARE / Product Type: HMO /   Current services in place:     Please see the CC Snaphot and Care Management Flowsheets for specific  details of this Danielle Diaz care plan.   Additional details/specific concerns r/t this admission:    New to clinic care coordination   No additional information to share     I will follow this admission in Epic. Please feel free to contact me with questions or for further collaboration in discharge planning.  [facility]   Adelina Cooper RN, Care Coordinator   [facility]'s   E-mail [email or web address]   [phone]

## 2021-10-25 NOTE — DISCHARGE INSTRUCTIONS
Warfarin instructions:  Take 7.5 mg by mouth once weekly (on Sundays) and 5 mg by mouth six days weekly (the rest of the week). Recheck INR on 10/26/2021 or as directed by the care team.

## 2021-10-26 ENCOUNTER — PATIENT OUTREACH (OUTPATIENT)
Dept: CARE COORDINATION | Facility: CLINIC | Age: 81
End: 2021-10-26

## 2021-10-26 DIAGNOSIS — R41.0 ACUTE DELIRIUM: Primary | ICD-10-CM

## 2021-10-26 NOTE — PROGRESS NOTES
Clinic Care Coordination Contact    Situation: Patient chart reviewed by care coordinator.    Background:   [facility] Discharge Summary     Danielle Diaz MRN# [id number]   Age: 81 year old YOB: 1940      Date of Admission:                  10/21/2021  Date of Discharge::                 10/25/2021  Admitting Physician:               Jennifer Butler MD  Discharge Physician:              Jennifer Butler MD  Primary Physician: Rachana Givens  Transferring Facility: N/A     Home clinic:    Unknown          Admission Diagnoses:   Acute delirium [R41.0]     Principle diagnosis: vascular dementia      Assessment: Patient has been accepted at [facility] (Admissions Phone: [phone] Main Phone: [phone]     Plan/Recommendations: Patient is in LTC.   No outreach made     [facility]   Adelina Cooper RN, Care Coordinator   [facility]'s   E-mail [email or web address]   [phone]

## 2021-10-27 ENCOUNTER — LAB REQUISITION (OUTPATIENT)
Dept: LAB | Facility: CLINIC | Age: 81
End: 2021-10-27
Payer: COMMERCIAL

## 2021-10-27 DIAGNOSIS — Z86.718 PERSONAL HISTORY OF OTHER VENOUS THROMBOSIS AND EMBOLISM: ICD-10-CM

## 2021-10-27 LAB — INR PPP: 1.32 (ref 0.85–1.15)

## 2021-10-27 PROCEDURE — 36415 COLL VENOUS BLD VENIPUNCTURE: CPT | Mod: ORL | Performed by: NURSE PRACTITIONER

## 2021-10-27 PROCEDURE — 85610 PROTHROMBIN TIME: CPT | Mod: ORL | Performed by: NURSE PRACTITIONER

## 2021-10-27 PROCEDURE — P9603 ONE-WAY ALLOW PRORATED MILES: HCPCS | Mod: ORL | Performed by: NURSE PRACTITIONER

## 2021-10-29 ENCOUNTER — LAB REQUISITION (OUTPATIENT)
Dept: LAB | Facility: CLINIC | Age: 81
End: 2021-10-29
Payer: COMMERCIAL

## 2021-10-29 DIAGNOSIS — I48.0 PAROXYSMAL ATRIAL FIBRILLATION (H): ICD-10-CM

## 2021-10-29 LAB — INR PPP: 1.75 (ref 0.85–1.15)

## 2021-10-29 PROCEDURE — P9603 ONE-WAY ALLOW PRORATED MILES: HCPCS | Mod: ORL | Performed by: NURSE PRACTITIONER

## 2021-10-29 PROCEDURE — 36415 COLL VENOUS BLD VENIPUNCTURE: CPT | Mod: ORL | Performed by: NURSE PRACTITIONER

## 2021-10-29 PROCEDURE — 85610 PROTHROMBIN TIME: CPT | Mod: ORL | Performed by: NURSE PRACTITIONER

## 2021-11-01 ENCOUNTER — LAB REQUISITION (OUTPATIENT)
Dept: LAB | Facility: CLINIC | Age: 81
End: 2021-11-01
Payer: COMMERCIAL

## 2021-11-01 DIAGNOSIS — I82.431 ACUTE EMBOLISM AND THROMBOSIS OF RIGHT POPLITEAL VEIN (H): ICD-10-CM

## 2021-11-01 LAB — INR PPP: 2.09 (ref 0.85–1.15)

## 2021-11-01 PROCEDURE — 36415 COLL VENOUS BLD VENIPUNCTURE: CPT | Mod: ORL | Performed by: NURSE PRACTITIONER

## 2021-11-01 PROCEDURE — 85610 PROTHROMBIN TIME: CPT | Mod: ORL | Performed by: NURSE PRACTITIONER

## 2021-11-01 PROCEDURE — P9603 ONE-WAY ALLOW PRORATED MILES: HCPCS | Mod: ORL | Performed by: NURSE PRACTITIONER

## 2021-11-08 ENCOUNTER — LAB REQUISITION (OUTPATIENT)
Dept: LAB | Facility: CLINIC | Age: 81
End: 2021-11-08
Payer: COMMERCIAL

## 2021-11-08 DIAGNOSIS — I48.0 PAROXYSMAL ATRIAL FIBRILLATION (H): ICD-10-CM

## 2021-11-08 LAB — INR PPP: 2.11 (ref 0.85–1.15)

## 2021-11-08 PROCEDURE — P9603 ONE-WAY ALLOW PRORATED MILES: HCPCS | Mod: ORL | Performed by: NURSE PRACTITIONER

## 2021-11-08 PROCEDURE — 85610 PROTHROMBIN TIME: CPT | Mod: ORL | Performed by: NURSE PRACTITIONER

## 2021-11-08 PROCEDURE — 36415 COLL VENOUS BLD VENIPUNCTURE: CPT | Mod: ORL | Performed by: NURSE PRACTITIONER

## 2021-11-15 ENCOUNTER — LAB REQUISITION (OUTPATIENT)
Dept: LAB | Facility: CLINIC | Age: 81
End: 2021-11-15
Payer: COMMERCIAL

## 2021-11-15 DIAGNOSIS — I48.0 PAROXYSMAL ATRIAL FIBRILLATION (H): ICD-10-CM

## 2021-11-15 LAB — INR PPP: 4.09 (ref 0.85–1.15)

## 2021-11-15 PROCEDURE — 36415 COLL VENOUS BLD VENIPUNCTURE: CPT | Mod: ORL | Performed by: NURSE PRACTITIONER

## 2021-11-15 PROCEDURE — 85610 PROTHROMBIN TIME: CPT | Mod: ORL | Performed by: NURSE PRACTITIONER

## 2021-11-15 PROCEDURE — P9603 ONE-WAY ALLOW PRORATED MILES: HCPCS | Mod: ORL | Performed by: NURSE PRACTITIONER

## 2021-11-16 ENCOUNTER — LAB REQUISITION (OUTPATIENT)
Dept: LAB | Facility: CLINIC | Age: 81
End: 2021-11-16
Payer: COMMERCIAL

## 2021-11-16 DIAGNOSIS — I48.0 PAROXYSMAL ATRIAL FIBRILLATION (H): ICD-10-CM

## 2021-11-17 LAB — INR PPP: 2.55 (ref 0.85–1.15)

## 2021-11-17 PROCEDURE — P9603 ONE-WAY ALLOW PRORATED MILES: HCPCS | Mod: ORL | Performed by: FAMILY MEDICINE

## 2021-11-17 PROCEDURE — 36415 COLL VENOUS BLD VENIPUNCTURE: CPT | Mod: ORL | Performed by: FAMILY MEDICINE

## 2021-11-17 PROCEDURE — 85610 PROTHROMBIN TIME: CPT | Mod: ORL | Performed by: FAMILY MEDICINE

## 2021-11-22 ENCOUNTER — LAB REQUISITION (OUTPATIENT)
Dept: LAB | Facility: CLINIC | Age: 81
End: 2021-11-22
Payer: COMMERCIAL

## 2021-11-22 DIAGNOSIS — I48.91 UNSPECIFIED ATRIAL FIBRILLATION (H): ICD-10-CM

## 2021-11-22 LAB — INR PPP: 2.78 (ref 0.85–1.15)

## 2021-11-22 PROCEDURE — 36415 COLL VENOUS BLD VENIPUNCTURE: CPT | Mod: ORL | Performed by: NURSE PRACTITIONER

## 2021-11-22 PROCEDURE — 85610 PROTHROMBIN TIME: CPT | Mod: ORL | Performed by: NURSE PRACTITIONER

## 2021-11-22 PROCEDURE — P9603 ONE-WAY ALLOW PRORATED MILES: HCPCS | Mod: ORL | Performed by: NURSE PRACTITIONER

## 2021-12-01 ENCOUNTER — LAB REQUISITION (OUTPATIENT)
Dept: LAB | Facility: CLINIC | Age: 81
End: 2021-12-01
Payer: COMMERCIAL

## 2021-12-01 DIAGNOSIS — I48.0 PAROXYSMAL ATRIAL FIBRILLATION (H): ICD-10-CM

## 2021-12-01 LAB — INR PPP: 3.41 (ref 0.85–1.15)

## 2021-12-01 PROCEDURE — P9603 ONE-WAY ALLOW PRORATED MILES: HCPCS | Mod: ORL | Performed by: NURSE PRACTITIONER

## 2021-12-01 PROCEDURE — 36415 COLL VENOUS BLD VENIPUNCTURE: CPT | Mod: ORL | Performed by: NURSE PRACTITIONER

## 2021-12-01 PROCEDURE — 85610 PROTHROMBIN TIME: CPT | Mod: ORL | Performed by: NURSE PRACTITIONER

## 2021-12-03 ENCOUNTER — LAB REQUISITION (OUTPATIENT)
Dept: LAB | Facility: CLINIC | Age: 81
End: 2021-12-03
Payer: COMMERCIAL

## 2021-12-03 DIAGNOSIS — I48.0 PAROXYSMAL ATRIAL FIBRILLATION (H): ICD-10-CM

## 2021-12-03 LAB — INR PPP: 1.83 (ref 0.85–1.15)

## 2021-12-03 PROCEDURE — 36415 COLL VENOUS BLD VENIPUNCTURE: CPT | Mod: ORL | Performed by: FAMILY MEDICINE

## 2021-12-03 PROCEDURE — 85610 PROTHROMBIN TIME: CPT | Mod: ORL | Performed by: FAMILY MEDICINE

## 2021-12-03 PROCEDURE — P9603 ONE-WAY ALLOW PRORATED MILES: HCPCS | Mod: ORL | Performed by: FAMILY MEDICINE

## 2021-12-06 ENCOUNTER — LAB REQUISITION (OUTPATIENT)
Dept: LAB | Facility: CLINIC | Age: 81
End: 2021-12-06
Payer: COMMERCIAL

## 2021-12-06 DIAGNOSIS — I48.0 PAROXYSMAL ATRIAL FIBRILLATION (H): ICD-10-CM

## 2021-12-06 LAB — INR PPP: 1.63 (ref 0.85–1.15)

## 2021-12-06 PROCEDURE — 85610 PROTHROMBIN TIME: CPT | Mod: ORL | Performed by: FAMILY MEDICINE

## 2021-12-06 PROCEDURE — P9603 ONE-WAY ALLOW PRORATED MILES: HCPCS | Mod: ORL | Performed by: FAMILY MEDICINE

## 2021-12-06 PROCEDURE — 36415 COLL VENOUS BLD VENIPUNCTURE: CPT | Mod: ORL | Performed by: FAMILY MEDICINE

## 2021-12-08 ENCOUNTER — LAB REQUISITION (OUTPATIENT)
Dept: LAB | Facility: CLINIC | Age: 81
End: 2021-12-08
Payer: COMMERCIAL

## 2021-12-08 DIAGNOSIS — I48.0 PAROXYSMAL ATRIAL FIBRILLATION (H): ICD-10-CM

## 2021-12-08 LAB — INR PPP: 1.67 (ref 0.85–1.15)

## 2021-12-08 PROCEDURE — 85610 PROTHROMBIN TIME: CPT | Mod: ORL | Performed by: FAMILY MEDICINE

## 2021-12-08 PROCEDURE — 36415 COLL VENOUS BLD VENIPUNCTURE: CPT | Mod: ORL | Performed by: FAMILY MEDICINE

## 2021-12-08 PROCEDURE — P9603 ONE-WAY ALLOW PRORATED MILES: HCPCS | Mod: ORL | Performed by: FAMILY MEDICINE

## 2021-12-10 ENCOUNTER — LAB REQUISITION (OUTPATIENT)
Dept: LAB | Facility: CLINIC | Age: 81
End: 2021-12-10
Payer: COMMERCIAL

## 2021-12-10 DIAGNOSIS — I48.0 PAROXYSMAL ATRIAL FIBRILLATION (H): ICD-10-CM

## 2021-12-10 LAB — INR PPP: 2.08 (ref 0.85–1.15)

## 2021-12-10 PROCEDURE — P9603 ONE-WAY ALLOW PRORATED MILES: HCPCS | Mod: ORL | Performed by: NURSE PRACTITIONER

## 2021-12-10 PROCEDURE — 85610 PROTHROMBIN TIME: CPT | Mod: ORL | Performed by: NURSE PRACTITIONER

## 2021-12-10 PROCEDURE — 36415 COLL VENOUS BLD VENIPUNCTURE: CPT | Mod: ORL | Performed by: NURSE PRACTITIONER

## 2021-12-15 ENCOUNTER — LAB REQUISITION (OUTPATIENT)
Dept: LAB | Facility: CLINIC | Age: 81
End: 2021-12-15
Payer: COMMERCIAL

## 2021-12-15 DIAGNOSIS — I48.0 PAROXYSMAL ATRIAL FIBRILLATION (H): ICD-10-CM

## 2021-12-15 LAB — INR PPP: 1.83 (ref 0.85–1.15)

## 2021-12-15 PROCEDURE — 85610 PROTHROMBIN TIME: CPT | Mod: ORL | Performed by: FAMILY MEDICINE

## 2021-12-15 PROCEDURE — 36415 COLL VENOUS BLD VENIPUNCTURE: CPT | Mod: ORL | Performed by: FAMILY MEDICINE

## 2021-12-15 PROCEDURE — P9603 ONE-WAY ALLOW PRORATED MILES: HCPCS | Mod: ORL | Performed by: FAMILY MEDICINE

## 2021-12-20 ENCOUNTER — LAB REQUISITION (OUTPATIENT)
Dept: LAB | Facility: CLINIC | Age: 81
End: 2021-12-20
Payer: COMMERCIAL

## 2021-12-20 DIAGNOSIS — I48.0 PAROXYSMAL ATRIAL FIBRILLATION (H): ICD-10-CM

## 2021-12-20 LAB — INR PPP: 2.21 (ref 0.85–1.15)

## 2021-12-20 PROCEDURE — P9603 ONE-WAY ALLOW PRORATED MILES: HCPCS | Mod: ORL | Performed by: NURSE PRACTITIONER

## 2021-12-20 PROCEDURE — 36415 COLL VENOUS BLD VENIPUNCTURE: CPT | Mod: ORL | Performed by: NURSE PRACTITIONER

## 2021-12-20 PROCEDURE — 85610 PROTHROMBIN TIME: CPT | Mod: ORL | Performed by: NURSE PRACTITIONER

## 2021-12-24 ENCOUNTER — LAB REQUISITION (OUTPATIENT)
Dept: LAB | Facility: CLINIC | Age: 81
End: 2021-12-24
Payer: COMMERCIAL

## 2021-12-24 DIAGNOSIS — F31.9 BIPOLAR DISORDER, UNSPECIFIED (H): ICD-10-CM

## 2021-12-24 DIAGNOSIS — I10 ESSENTIAL (PRIMARY) HYPERTENSION: ICD-10-CM

## 2021-12-24 LAB
ALBUMIN SERPL-MCNC: 3.3 G/DL (ref 3.4–5)
ALP SERPL-CCNC: 86 U/L (ref 40–150)
ALT SERPL W P-5'-P-CCNC: 64 U/L (ref 0–50)
ANION GAP SERPL CALCULATED.3IONS-SCNC: 5 MMOL/L (ref 3–14)
AST SERPL W P-5'-P-CCNC: 28 U/L (ref 0–45)
BILIRUB DIRECT SERPL-MCNC: 0.3 MG/DL (ref 0–0.2)
BILIRUB SERPL-MCNC: 0.8 MG/DL (ref 0.2–1.3)
BUN SERPL-MCNC: 16 MG/DL (ref 7–30)
CALCIUM SERPL-MCNC: 9.1 MG/DL (ref 8.5–10.1)
CHLORIDE BLD-SCNC: 106 MMOL/L (ref 94–109)
CO2 SERPL-SCNC: 30 MMOL/L (ref 20–32)
CREAT SERPL-MCNC: 0.67 MG/DL (ref 0.52–1.04)
ERYTHROCYTE [DISTWIDTH] IN BLOOD BY AUTOMATED COUNT: 13.8 % (ref 10–15)
GFR SERPL CREATININE-BSD FRML MDRD: 87 ML/MIN/1.73M2
GLUCOSE BLD-MCNC: 103 MG/DL (ref 70–99)
HCT VFR BLD AUTO: 34.7 % (ref 35–47)
HGB BLD-MCNC: 11.3 G/DL (ref 11.7–15.7)
MCH RBC QN AUTO: 30 PG (ref 26.5–33)
MCHC RBC AUTO-ENTMCNC: 32.6 G/DL (ref 31.5–36.5)
MCV RBC AUTO: 92 FL (ref 78–100)
PLATELET # BLD AUTO: 232 10E3/UL (ref 150–450)
POTASSIUM BLD-SCNC: 3.6 MMOL/L (ref 3.4–5.3)
PROT SERPL-MCNC: 6.8 G/DL (ref 6.8–8.8)
RBC # BLD AUTO: 3.77 10E6/UL (ref 3.8–5.2)
SODIUM SERPL-SCNC: 141 MMOL/L (ref 133–144)
WBC # BLD AUTO: 6.3 10E3/UL (ref 4–11)

## 2021-12-24 PROCEDURE — P9603 ONE-WAY ALLOW PRORATED MILES: HCPCS | Mod: ORL | Performed by: NURSE PRACTITIONER

## 2021-12-24 PROCEDURE — 36415 COLL VENOUS BLD VENIPUNCTURE: CPT | Mod: ORL | Performed by: NURSE PRACTITIONER

## 2021-12-24 PROCEDURE — 80053 COMPREHEN METABOLIC PANEL: CPT | Mod: ORL | Performed by: NURSE PRACTITIONER

## 2021-12-24 PROCEDURE — 82248 BILIRUBIN DIRECT: CPT | Mod: ORL | Performed by: NURSE PRACTITIONER

## 2021-12-24 PROCEDURE — 85027 COMPLETE CBC AUTOMATED: CPT | Mod: ORL | Performed by: NURSE PRACTITIONER

## 2021-12-27 ENCOUNTER — LAB REQUISITION (OUTPATIENT)
Dept: LAB | Facility: CLINIC | Age: 81
End: 2021-12-27
Payer: COMMERCIAL

## 2021-12-27 DIAGNOSIS — I48.0 PAROXYSMAL ATRIAL FIBRILLATION (H): ICD-10-CM

## 2021-12-27 LAB — INR PPP: 1.65 (ref 0.85–1.15)

## 2021-12-27 PROCEDURE — P9603 ONE-WAY ALLOW PRORATED MILES: HCPCS | Mod: ORL | Performed by: NURSE PRACTITIONER

## 2021-12-27 PROCEDURE — 85610 PROTHROMBIN TIME: CPT | Mod: ORL | Performed by: NURSE PRACTITIONER

## 2021-12-27 PROCEDURE — 36415 COLL VENOUS BLD VENIPUNCTURE: CPT | Mod: ORL | Performed by: NURSE PRACTITIONER

## 2022-01-03 ENCOUNTER — LAB REQUISITION (OUTPATIENT)
Dept: LAB | Facility: CLINIC | Age: 82
End: 2022-01-03
Payer: COMMERCIAL

## 2022-01-03 DIAGNOSIS — I48.0 PAROXYSMAL ATRIAL FIBRILLATION (H): ICD-10-CM

## 2022-01-03 LAB — INR PPP: 2.29 (ref 0.85–1.15)

## 2022-01-03 PROCEDURE — 36415 COLL VENOUS BLD VENIPUNCTURE: CPT | Mod: ORL | Performed by: FAMILY MEDICINE

## 2022-01-03 PROCEDURE — 85610 PROTHROMBIN TIME: CPT | Mod: ORL | Performed by: FAMILY MEDICINE

## 2022-01-12 ENCOUNTER — LAB REQUISITION (OUTPATIENT)
Dept: LAB | Facility: CLINIC | Age: 82
End: 2022-01-12
Payer: COMMERCIAL

## 2022-01-12 DIAGNOSIS — I48.0 PAROXYSMAL ATRIAL FIBRILLATION (H): ICD-10-CM

## 2022-01-12 LAB — INR PPP: 2.01 (ref 0.85–1.15)

## 2022-01-12 PROCEDURE — P9603 ONE-WAY ALLOW PRORATED MILES: HCPCS | Mod: ORL | Performed by: NURSE PRACTITIONER

## 2022-01-12 PROCEDURE — 85610 PROTHROMBIN TIME: CPT | Mod: ORL | Performed by: NURSE PRACTITIONER

## 2022-01-12 PROCEDURE — 36415 COLL VENOUS BLD VENIPUNCTURE: CPT | Mod: ORL | Performed by: NURSE PRACTITIONER

## 2022-01-19 ENCOUNTER — LAB REQUISITION (OUTPATIENT)
Dept: LAB | Facility: CLINIC | Age: 82
End: 2022-01-19
Payer: COMMERCIAL

## 2022-01-19 DIAGNOSIS — I48.0 PAROXYSMAL ATRIAL FIBRILLATION (H): ICD-10-CM

## 2022-01-19 DIAGNOSIS — R60.9 EDEMA, UNSPECIFIED: ICD-10-CM

## 2022-01-19 LAB
ANION GAP SERPL CALCULATED.3IONS-SCNC: 6 MMOL/L (ref 3–14)
BUN SERPL-MCNC: 13 MG/DL (ref 7–30)
CALCIUM SERPL-MCNC: 8.8 MG/DL (ref 8.5–10.1)
CHLORIDE BLD-SCNC: 108 MMOL/L (ref 94–109)
CO2 SERPL-SCNC: 27 MMOL/L (ref 20–32)
CREAT SERPL-MCNC: 0.74 MG/DL (ref 0.52–1.04)
GFR SERPL CREATININE-BSD FRML MDRD: 81 ML/MIN/1.73M2
GLUCOSE BLD-MCNC: 87 MG/DL (ref 70–99)
INR PPP: 2.6 (ref 0.85–1.15)
POTASSIUM BLD-SCNC: 3.8 MMOL/L (ref 3.4–5.3)
SODIUM SERPL-SCNC: 141 MMOL/L (ref 133–144)

## 2022-01-19 PROCEDURE — 80048 BASIC METABOLIC PNL TOTAL CA: CPT | Mod: ORL | Performed by: NURSE PRACTITIONER

## 2022-01-19 PROCEDURE — 85610 PROTHROMBIN TIME: CPT | Mod: ORL | Performed by: FAMILY MEDICINE

## 2022-01-19 PROCEDURE — P9603 ONE-WAY ALLOW PRORATED MILES: HCPCS | Mod: ORL | Performed by: FAMILY MEDICINE

## 2022-01-19 PROCEDURE — 36415 COLL VENOUS BLD VENIPUNCTURE: CPT | Mod: ORL | Performed by: FAMILY MEDICINE

## 2022-01-19 PROCEDURE — 36415 COLL VENOUS BLD VENIPUNCTURE: CPT | Mod: ORL | Performed by: NURSE PRACTITIONER

## 2022-01-21 ENCOUNTER — LAB REQUISITION (OUTPATIENT)
Dept: LAB | Facility: CLINIC | Age: 82
End: 2022-01-21
Payer: COMMERCIAL

## 2022-01-21 DIAGNOSIS — I48.0 PAROXYSMAL ATRIAL FIBRILLATION (H): ICD-10-CM

## 2022-01-21 LAB — INR PPP: 2.06 (ref 0.85–1.15)

## 2022-01-21 PROCEDURE — P9603 ONE-WAY ALLOW PRORATED MILES: HCPCS | Mod: ORL | Performed by: NURSE PRACTITIONER

## 2022-01-21 PROCEDURE — 36415 COLL VENOUS BLD VENIPUNCTURE: CPT | Mod: ORL | Performed by: NURSE PRACTITIONER

## 2022-01-21 PROCEDURE — 85610 PROTHROMBIN TIME: CPT | Mod: ORL | Performed by: NURSE PRACTITIONER

## 2022-01-24 ENCOUNTER — LAB REQUISITION (OUTPATIENT)
Dept: LAB | Facility: CLINIC | Age: 82
End: 2022-01-24
Payer: COMMERCIAL

## 2022-01-24 DIAGNOSIS — I48.91 UNSPECIFIED ATRIAL FIBRILLATION (H): ICD-10-CM

## 2022-01-24 LAB — INR PPP: 2.03 (ref 0.85–1.15)

## 2022-01-24 PROCEDURE — P9603 ONE-WAY ALLOW PRORATED MILES: HCPCS | Mod: ORL | Performed by: NURSE PRACTITIONER

## 2022-01-24 PROCEDURE — 36415 COLL VENOUS BLD VENIPUNCTURE: CPT | Mod: ORL | Performed by: NURSE PRACTITIONER

## 2022-01-24 PROCEDURE — 85610 PROTHROMBIN TIME: CPT | Mod: ORL | Performed by: NURSE PRACTITIONER

## 2022-01-28 ENCOUNTER — LAB REQUISITION (OUTPATIENT)
Dept: LAB | Facility: CLINIC | Age: 82
End: 2022-01-28
Payer: COMMERCIAL

## 2022-01-28 DIAGNOSIS — I48.91 UNSPECIFIED ATRIAL FIBRILLATION (H): ICD-10-CM

## 2022-01-28 LAB — INR PPP: 2.23 (ref 0.85–1.15)

## 2022-01-28 PROCEDURE — 85610 PROTHROMBIN TIME: CPT | Mod: ORL | Performed by: NURSE PRACTITIONER

## 2022-01-28 PROCEDURE — P9603 ONE-WAY ALLOW PRORATED MILES: HCPCS | Mod: ORL | Performed by: NURSE PRACTITIONER

## 2022-01-28 PROCEDURE — 36415 COLL VENOUS BLD VENIPUNCTURE: CPT | Mod: ORL | Performed by: NURSE PRACTITIONER

## 2022-02-04 ENCOUNTER — LAB REQUISITION (OUTPATIENT)
Dept: LAB | Facility: CLINIC | Age: 82
End: 2022-02-04
Payer: COMMERCIAL

## 2022-02-04 DIAGNOSIS — I48.0 PAROXYSMAL ATRIAL FIBRILLATION (H): ICD-10-CM

## 2022-02-04 LAB — INR PPP: 2.57 (ref 0.85–1.15)

## 2022-02-04 PROCEDURE — 85610 PROTHROMBIN TIME: CPT | Mod: ORL | Performed by: NURSE PRACTITIONER

## 2022-02-04 PROCEDURE — P9603 ONE-WAY ALLOW PRORATED MILES: HCPCS | Mod: ORL | Performed by: NURSE PRACTITIONER

## 2022-02-04 PROCEDURE — 36415 COLL VENOUS BLD VENIPUNCTURE: CPT | Mod: ORL | Performed by: NURSE PRACTITIONER

## 2022-02-11 ENCOUNTER — LAB REQUISITION (OUTPATIENT)
Dept: LAB | Facility: CLINIC | Age: 82
End: 2022-02-11
Payer: COMMERCIAL

## 2022-02-11 DIAGNOSIS — I48.0 PAROXYSMAL ATRIAL FIBRILLATION (H): ICD-10-CM

## 2022-02-11 LAB — INR PPP: 2.34 (ref 0.85–1.15)

## 2022-02-11 PROCEDURE — 36415 COLL VENOUS BLD VENIPUNCTURE: CPT | Mod: ORL | Performed by: NURSE PRACTITIONER

## 2022-02-11 PROCEDURE — P9603 ONE-WAY ALLOW PRORATED MILES: HCPCS | Mod: ORL | Performed by: NURSE PRACTITIONER

## 2022-02-11 PROCEDURE — 85610 PROTHROMBIN TIME: CPT | Mod: ORL | Performed by: NURSE PRACTITIONER

## 2022-02-24 ENCOUNTER — LAB REQUISITION (OUTPATIENT)
Dept: LAB | Facility: CLINIC | Age: 82
End: 2022-02-24
Payer: COMMERCIAL

## 2022-02-24 DIAGNOSIS — I48.91 UNSPECIFIED ATRIAL FIBRILLATION (H): ICD-10-CM

## 2022-02-25 LAB — INR PPP: 1.82 (ref 0.85–1.15)

## 2022-02-25 PROCEDURE — P9603 ONE-WAY ALLOW PRORATED MILES: HCPCS | Mod: ORL | Performed by: FAMILY MEDICINE

## 2022-02-25 PROCEDURE — 36415 COLL VENOUS BLD VENIPUNCTURE: CPT | Mod: ORL | Performed by: FAMILY MEDICINE

## 2022-02-25 PROCEDURE — 85610 PROTHROMBIN TIME: CPT | Mod: ORL | Performed by: FAMILY MEDICINE

## 2022-03-03 ENCOUNTER — LAB REQUISITION (OUTPATIENT)
Dept: LAB | Facility: CLINIC | Age: 82
End: 2022-03-03
Payer: COMMERCIAL

## 2022-03-03 DIAGNOSIS — I48.0 PAROXYSMAL ATRIAL FIBRILLATION (H): ICD-10-CM

## 2022-03-04 LAB — INR PPP: 2.09 (ref 0.85–1.15)

## 2022-03-04 PROCEDURE — 85610 PROTHROMBIN TIME: CPT | Mod: ORL | Performed by: NURSE PRACTITIONER

## 2022-03-04 PROCEDURE — 36415 COLL VENOUS BLD VENIPUNCTURE: CPT | Mod: ORL | Performed by: NURSE PRACTITIONER

## 2022-03-04 PROCEDURE — P9603 ONE-WAY ALLOW PRORATED MILES: HCPCS | Mod: ORL | Performed by: NURSE PRACTITIONER

## 2022-03-11 ENCOUNTER — LAB REQUISITION (OUTPATIENT)
Dept: LAB | Facility: CLINIC | Age: 82
End: 2022-03-11
Payer: COMMERCIAL

## 2022-03-11 DIAGNOSIS — I48.0 PAROXYSMAL ATRIAL FIBRILLATION (H): ICD-10-CM

## 2022-03-11 LAB — INR PPP: 2.03 (ref 0.85–1.15)

## 2022-03-11 PROCEDURE — 85610 PROTHROMBIN TIME: CPT | Mod: ORL | Performed by: NURSE PRACTITIONER

## 2022-03-11 PROCEDURE — P9603 ONE-WAY ALLOW PRORATED MILES: HCPCS | Mod: ORL | Performed by: NURSE PRACTITIONER

## 2022-03-11 PROCEDURE — 36415 COLL VENOUS BLD VENIPUNCTURE: CPT | Mod: ORL | Performed by: NURSE PRACTITIONER

## 2022-03-18 ENCOUNTER — LAB REQUISITION (OUTPATIENT)
Dept: LAB | Facility: CLINIC | Age: 82
End: 2022-03-18
Payer: MEDICARE

## 2022-03-18 DIAGNOSIS — I48.0 PAROXYSMAL ATRIAL FIBRILLATION (H): ICD-10-CM

## 2022-03-18 LAB — INR PPP: 2.73 (ref 0.85–1.15)

## 2022-03-18 PROCEDURE — P9603 ONE-WAY ALLOW PRORATED MILES: HCPCS | Mod: ORL | Performed by: NURSE PRACTITIONER

## 2022-03-18 PROCEDURE — 85610 PROTHROMBIN TIME: CPT | Mod: ORL | Performed by: NURSE PRACTITIONER

## 2022-03-18 PROCEDURE — 36415 COLL VENOUS BLD VENIPUNCTURE: CPT | Mod: ORL | Performed by: NURSE PRACTITIONER

## 2022-03-30 ENCOUNTER — LAB REQUISITION (OUTPATIENT)
Dept: LAB | Facility: CLINIC | Age: 82
End: 2022-03-30
Payer: MEDICARE

## 2022-03-30 DIAGNOSIS — I48.0 PAROXYSMAL ATRIAL FIBRILLATION (H): ICD-10-CM

## 2022-03-30 LAB — INR PPP: 2.71 (ref 0.85–1.15)

## 2022-03-30 PROCEDURE — P9603 ONE-WAY ALLOW PRORATED MILES: HCPCS | Mod: ORL | Performed by: NURSE PRACTITIONER

## 2022-03-30 PROCEDURE — 85610 PROTHROMBIN TIME: CPT | Mod: ORL | Performed by: NURSE PRACTITIONER

## 2022-03-30 PROCEDURE — 36415 COLL VENOUS BLD VENIPUNCTURE: CPT | Mod: ORL | Performed by: NURSE PRACTITIONER

## 2022-04-13 ENCOUNTER — LAB REQUISITION (OUTPATIENT)
Dept: LAB | Facility: CLINIC | Age: 82
End: 2022-04-13
Payer: MEDICARE

## 2022-04-13 DIAGNOSIS — I48.0 PAROXYSMAL ATRIAL FIBRILLATION (H): ICD-10-CM

## 2022-04-13 LAB — INR PPP: 2.69 (ref 0.85–1.15)

## 2022-04-13 PROCEDURE — 85610 PROTHROMBIN TIME: CPT | Mod: ORL | Performed by: NURSE PRACTITIONER

## 2022-04-13 PROCEDURE — P9603 ONE-WAY ALLOW PRORATED MILES: HCPCS | Mod: ORL | Performed by: NURSE PRACTITIONER

## 2022-04-13 PROCEDURE — 36415 COLL VENOUS BLD VENIPUNCTURE: CPT | Mod: ORL | Performed by: NURSE PRACTITIONER

## 2022-05-04 ENCOUNTER — LAB REQUISITION (OUTPATIENT)
Dept: LAB | Facility: CLINIC | Age: 82
End: 2022-05-04
Payer: MEDICARE

## 2022-05-04 DIAGNOSIS — I48.91 UNSPECIFIED ATRIAL FIBRILLATION (H): ICD-10-CM

## 2022-05-04 LAB — INR PPP: 2.8 (ref 0.85–1.15)

## 2022-05-04 PROCEDURE — 36415 COLL VENOUS BLD VENIPUNCTURE: CPT | Mod: ORL | Performed by: NURSE PRACTITIONER

## 2022-05-04 PROCEDURE — P9603 ONE-WAY ALLOW PRORATED MILES: HCPCS | Mod: ORL | Performed by: NURSE PRACTITIONER

## 2022-05-04 PROCEDURE — 85610 PROTHROMBIN TIME: CPT | Mod: ORL | Performed by: NURSE PRACTITIONER

## 2022-05-25 ENCOUNTER — LAB REQUISITION (OUTPATIENT)
Dept: LAB | Facility: CLINIC | Age: 82
End: 2022-05-25
Payer: MEDICARE

## 2022-05-25 DIAGNOSIS — I48.91 UNSPECIFIED ATRIAL FIBRILLATION (H): ICD-10-CM

## 2022-05-25 LAB — INR PPP: 2.23 (ref 0.85–1.15)

## 2022-05-25 PROCEDURE — 36415 COLL VENOUS BLD VENIPUNCTURE: CPT | Mod: ORL | Performed by: NURSE PRACTITIONER

## 2022-05-25 PROCEDURE — P9603 ONE-WAY ALLOW PRORATED MILES: HCPCS | Mod: ORL | Performed by: NURSE PRACTITIONER

## 2022-05-25 PROCEDURE — 85610 PROTHROMBIN TIME: CPT | Mod: ORL | Performed by: NURSE PRACTITIONER

## 2022-06-22 ENCOUNTER — LAB REQUISITION (OUTPATIENT)
Dept: LAB | Facility: CLINIC | Age: 82
End: 2022-06-22
Payer: MEDICARE

## 2022-06-22 DIAGNOSIS — I48.0 PAROXYSMAL ATRIAL FIBRILLATION (H): ICD-10-CM

## 2022-06-22 LAB — INR PPP: 2.28 (ref 0.85–1.15)

## 2022-06-22 PROCEDURE — 85610 PROTHROMBIN TIME: CPT | Mod: ORL | Performed by: NURSE PRACTITIONER

## 2022-06-22 PROCEDURE — 36415 COLL VENOUS BLD VENIPUNCTURE: CPT | Mod: ORL | Performed by: NURSE PRACTITIONER

## 2022-06-22 PROCEDURE — P9603 ONE-WAY ALLOW PRORATED MILES: HCPCS | Mod: ORL | Performed by: NURSE PRACTITIONER

## 2022-07-19 ENCOUNTER — LAB REQUISITION (OUTPATIENT)
Dept: LAB | Facility: CLINIC | Age: 82
End: 2022-07-19
Payer: COMMERCIAL

## 2022-07-19 DIAGNOSIS — I48.20 CHRONIC ATRIAL FIBRILLATION, UNSPECIFIED (H): ICD-10-CM

## 2022-07-20 LAB — INR PPP: 2.49 (ref 0.85–1.15)

## 2022-07-20 PROCEDURE — P9603 ONE-WAY ALLOW PRORATED MILES: HCPCS | Mod: ORL | Performed by: NURSE PRACTITIONER

## 2022-07-20 PROCEDURE — 85610 PROTHROMBIN TIME: CPT | Mod: ORL | Performed by: NURSE PRACTITIONER

## 2022-07-20 PROCEDURE — 36415 COLL VENOUS BLD VENIPUNCTURE: CPT | Mod: ORL | Performed by: NURSE PRACTITIONER

## 2022-08-16 ENCOUNTER — LAB REQUISITION (OUTPATIENT)
Dept: LAB | Facility: CLINIC | Age: 82
End: 2022-08-16
Payer: COMMERCIAL

## 2022-08-16 DIAGNOSIS — I48.91 UNSPECIFIED ATRIAL FIBRILLATION (H): ICD-10-CM

## 2022-08-17 LAB — INR PPP: 3.09 (ref 0.85–1.15)

## 2022-08-17 PROCEDURE — 36415 COLL VENOUS BLD VENIPUNCTURE: CPT | Mod: ORL | Performed by: NURSE PRACTITIONER

## 2022-08-17 PROCEDURE — 85610 PROTHROMBIN TIME: CPT | Mod: ORL | Performed by: NURSE PRACTITIONER

## 2022-08-17 PROCEDURE — P9603 ONE-WAY ALLOW PRORATED MILES: HCPCS | Mod: ORL | Performed by: NURSE PRACTITIONER

## 2022-08-23 ENCOUNTER — LAB REQUISITION (OUTPATIENT)
Dept: LAB | Facility: CLINIC | Age: 82
End: 2022-08-23
Payer: COMMERCIAL

## 2022-08-23 DIAGNOSIS — I48.0 PAROXYSMAL ATRIAL FIBRILLATION (H): ICD-10-CM

## 2022-08-24 LAB — INR PPP: 3.47 (ref 0.85–1.15)

## 2022-08-24 PROCEDURE — 36415 COLL VENOUS BLD VENIPUNCTURE: CPT | Mod: ORL | Performed by: NURSE PRACTITIONER

## 2022-08-24 PROCEDURE — 85610 PROTHROMBIN TIME: CPT | Mod: ORL | Performed by: NURSE PRACTITIONER

## 2022-08-24 PROCEDURE — P9603 ONE-WAY ALLOW PRORATED MILES: HCPCS | Mod: ORL | Performed by: NURSE PRACTITIONER

## 2022-08-30 ENCOUNTER — LAB REQUISITION (OUTPATIENT)
Dept: LAB | Facility: CLINIC | Age: 82
End: 2022-08-30
Payer: COMMERCIAL

## 2022-08-30 DIAGNOSIS — I48.0 PAROXYSMAL ATRIAL FIBRILLATION (H): ICD-10-CM

## 2022-08-31 LAB — INR PPP: 3.08 (ref 0.85–1.15)

## 2022-08-31 PROCEDURE — P9603 ONE-WAY ALLOW PRORATED MILES: HCPCS | Mod: ORL | Performed by: NURSE PRACTITIONER

## 2022-08-31 PROCEDURE — 85610 PROTHROMBIN TIME: CPT | Mod: ORL | Performed by: NURSE PRACTITIONER

## 2022-08-31 PROCEDURE — 36415 COLL VENOUS BLD VENIPUNCTURE: CPT | Mod: ORL | Performed by: NURSE PRACTITIONER

## 2022-09-06 ENCOUNTER — LAB REQUISITION (OUTPATIENT)
Dept: LAB | Facility: CLINIC | Age: 82
End: 2022-09-06
Payer: COMMERCIAL

## 2022-09-06 DIAGNOSIS — I48.0 PAROXYSMAL ATRIAL FIBRILLATION (H): ICD-10-CM

## 2022-09-07 LAB — INR PPP: 3.15 (ref 0.85–1.15)

## 2022-09-07 PROCEDURE — 85610 PROTHROMBIN TIME: CPT | Mod: ORL | Performed by: NURSE PRACTITIONER

## 2022-09-07 PROCEDURE — 36415 COLL VENOUS BLD VENIPUNCTURE: CPT | Mod: ORL | Performed by: NURSE PRACTITIONER

## 2022-09-07 PROCEDURE — P9603 ONE-WAY ALLOW PRORATED MILES: HCPCS | Mod: ORL | Performed by: NURSE PRACTITIONER

## 2022-09-13 ENCOUNTER — LAB REQUISITION (OUTPATIENT)
Dept: LAB | Facility: CLINIC | Age: 82
End: 2022-09-13
Payer: COMMERCIAL

## 2022-09-13 DIAGNOSIS — I48.0 PAROXYSMAL ATRIAL FIBRILLATION (H): ICD-10-CM

## 2022-09-14 LAB — INR PPP: 1.91 (ref 0.85–1.15)

## 2022-09-14 PROCEDURE — 36415 COLL VENOUS BLD VENIPUNCTURE: CPT | Mod: ORL | Performed by: NURSE PRACTITIONER

## 2022-09-14 PROCEDURE — P9603 ONE-WAY ALLOW PRORATED MILES: HCPCS | Mod: ORL | Performed by: NURSE PRACTITIONER

## 2022-09-14 PROCEDURE — 85610 PROTHROMBIN TIME: CPT | Mod: ORL | Performed by: NURSE PRACTITIONER

## 2022-09-20 ENCOUNTER — LAB REQUISITION (OUTPATIENT)
Dept: LAB | Facility: CLINIC | Age: 82
End: 2022-09-20
Payer: COMMERCIAL

## 2022-09-20 DIAGNOSIS — I48.0 PAROXYSMAL ATRIAL FIBRILLATION (H): ICD-10-CM

## 2022-09-21 LAB — INR PPP: 1.94 (ref 0.85–1.15)

## 2022-09-21 PROCEDURE — P9603 ONE-WAY ALLOW PRORATED MILES: HCPCS | Mod: ORL | Performed by: NURSE PRACTITIONER

## 2022-09-21 PROCEDURE — 36415 COLL VENOUS BLD VENIPUNCTURE: CPT | Mod: ORL | Performed by: NURSE PRACTITIONER

## 2022-09-21 PROCEDURE — 85610 PROTHROMBIN TIME: CPT | Mod: ORL | Performed by: NURSE PRACTITIONER

## 2022-09-27 ENCOUNTER — LAB REQUISITION (OUTPATIENT)
Dept: LAB | Facility: CLINIC | Age: 82
End: 2022-09-27
Payer: COMMERCIAL

## 2022-09-27 DIAGNOSIS — I48.0 PAROXYSMAL ATRIAL FIBRILLATION (H): ICD-10-CM

## 2022-09-27 DIAGNOSIS — I48.91 UNSPECIFIED ATRIAL FIBRILLATION (H): ICD-10-CM

## 2022-09-27 DIAGNOSIS — I10 ESSENTIAL (PRIMARY) HYPERTENSION: ICD-10-CM

## 2022-09-28 LAB
ANION GAP SERPL CALCULATED.3IONS-SCNC: 4 MMOL/L (ref 3–14)
BUN SERPL-MCNC: 15 MG/DL (ref 7–30)
CALCIUM SERPL-MCNC: 8.4 MG/DL (ref 8.5–10.1)
CHLORIDE BLD-SCNC: 110 MMOL/L (ref 94–109)
CO2 SERPL-SCNC: 28 MMOL/L (ref 20–32)
CREAT SERPL-MCNC: 0.71 MG/DL (ref 0.52–1.04)
ERYTHROCYTE [DISTWIDTH] IN BLOOD BY AUTOMATED COUNT: 14.2 % (ref 10–15)
GFR SERPL CREATININE-BSD FRML MDRD: 84 ML/MIN/1.73M2
GLUCOSE BLD-MCNC: 99 MG/DL (ref 70–99)
HCT VFR BLD AUTO: 40 % (ref 35–47)
HGB BLD-MCNC: 12.9 G/DL (ref 11.7–15.7)
INR PPP: 2 (ref 0.85–1.15)
MCH RBC QN AUTO: 30.4 PG (ref 26.5–33)
MCHC RBC AUTO-ENTMCNC: 32.3 G/DL (ref 31.5–36.5)
MCV RBC AUTO: 94 FL (ref 78–100)
PLATELET # BLD AUTO: 212 10E3/UL (ref 150–450)
POTASSIUM BLD-SCNC: 4 MMOL/L (ref 3.4–5.3)
RBC # BLD AUTO: 4.25 10E6/UL (ref 3.8–5.2)
SODIUM SERPL-SCNC: 142 MMOL/L (ref 133–144)
WBC # BLD AUTO: 4.9 10E3/UL (ref 4–11)

## 2022-09-28 PROCEDURE — 85027 COMPLETE CBC AUTOMATED: CPT | Mod: ORL | Performed by: NURSE PRACTITIONER

## 2022-09-28 PROCEDURE — 36415 COLL VENOUS BLD VENIPUNCTURE: CPT | Mod: ORL | Performed by: NURSE PRACTITIONER

## 2022-09-28 PROCEDURE — P9603 ONE-WAY ALLOW PRORATED MILES: HCPCS | Mod: ORL | Performed by: NURSE PRACTITIONER

## 2022-09-28 PROCEDURE — 85610 PROTHROMBIN TIME: CPT | Mod: ORL | Performed by: NURSE PRACTITIONER

## 2022-09-28 PROCEDURE — 80048 BASIC METABOLIC PNL TOTAL CA: CPT | Mod: ORL | Performed by: NURSE PRACTITIONER

## 2022-10-11 ENCOUNTER — LAB REQUISITION (OUTPATIENT)
Dept: LAB | Facility: CLINIC | Age: 82
End: 2022-10-11
Payer: COMMERCIAL

## 2022-10-11 DIAGNOSIS — I48.0 PAROXYSMAL ATRIAL FIBRILLATION (H): ICD-10-CM

## 2022-10-12 LAB — INR PPP: 2.04 (ref 0.85–1.15)

## 2022-10-12 PROCEDURE — P9603 ONE-WAY ALLOW PRORATED MILES: HCPCS | Mod: ORL | Performed by: NURSE PRACTITIONER

## 2022-10-12 PROCEDURE — 85610 PROTHROMBIN TIME: CPT | Mod: ORL | Performed by: NURSE PRACTITIONER

## 2022-10-12 PROCEDURE — 36415 COLL VENOUS BLD VENIPUNCTURE: CPT | Mod: ORL | Performed by: NURSE PRACTITIONER

## 2022-10-25 ENCOUNTER — LAB REQUISITION (OUTPATIENT)
Dept: LAB | Facility: CLINIC | Age: 82
End: 2022-10-25
Payer: COMMERCIAL

## 2022-10-25 DIAGNOSIS — I48.0 PAROXYSMAL ATRIAL FIBRILLATION (H): ICD-10-CM

## 2022-10-26 LAB — INR PPP: 2.34 (ref 0.85–1.15)

## 2022-10-26 PROCEDURE — 36415 COLL VENOUS BLD VENIPUNCTURE: CPT | Mod: ORL | Performed by: NURSE PRACTITIONER

## 2022-10-26 PROCEDURE — P9603 ONE-WAY ALLOW PRORATED MILES: HCPCS | Mod: ORL | Performed by: NURSE PRACTITIONER

## 2022-10-26 PROCEDURE — 85610 PROTHROMBIN TIME: CPT | Mod: ORL | Performed by: NURSE PRACTITIONER

## 2022-11-15 ENCOUNTER — LAB REQUISITION (OUTPATIENT)
Dept: LAB | Facility: CLINIC | Age: 82
End: 2022-11-15
Payer: COMMERCIAL

## 2022-11-15 DIAGNOSIS — I48.0 PAROXYSMAL ATRIAL FIBRILLATION (H): ICD-10-CM

## 2022-11-16 LAB — INR PPP: 5.11 (ref 0.85–1.15)

## 2022-11-16 PROCEDURE — 85610 PROTHROMBIN TIME: CPT | Mod: ORL | Performed by: NURSE PRACTITIONER

## 2022-11-16 PROCEDURE — 36415 COLL VENOUS BLD VENIPUNCTURE: CPT | Mod: ORL | Performed by: NURSE PRACTITIONER

## 2022-11-16 PROCEDURE — P9603 ONE-WAY ALLOW PRORATED MILES: HCPCS | Mod: ORL | Performed by: NURSE PRACTITIONER

## 2022-11-17 ENCOUNTER — LAB REQUISITION (OUTPATIENT)
Dept: LAB | Facility: CLINIC | Age: 82
End: 2022-11-17
Payer: COMMERCIAL

## 2022-11-17 DIAGNOSIS — I48.0 PAROXYSMAL ATRIAL FIBRILLATION (H): ICD-10-CM

## 2022-11-18 LAB — INR PPP: 2.85 (ref 0.85–1.15)

## 2022-11-18 PROCEDURE — 85610 PROTHROMBIN TIME: CPT | Mod: ORL | Performed by: NURSE PRACTITIONER

## 2022-11-18 PROCEDURE — 36415 COLL VENOUS BLD VENIPUNCTURE: CPT | Mod: ORL | Performed by: NURSE PRACTITIONER

## 2022-11-18 PROCEDURE — P9603 ONE-WAY ALLOW PRORATED MILES: HCPCS | Mod: ORL | Performed by: NURSE PRACTITIONER

## 2022-11-20 ENCOUNTER — LAB REQUISITION (OUTPATIENT)
Dept: LAB | Facility: CLINIC | Age: 82
End: 2022-11-20
Payer: COMMERCIAL

## 2022-11-20 DIAGNOSIS — I48.0 PAROXYSMAL ATRIAL FIBRILLATION (H): ICD-10-CM

## 2022-11-21 LAB — INR PPP: 2.3 (ref 0.85–1.15)

## 2022-11-21 PROCEDURE — 36415 COLL VENOUS BLD VENIPUNCTURE: CPT | Mod: ORL | Performed by: NURSE PRACTITIONER

## 2022-11-21 PROCEDURE — P9603 ONE-WAY ALLOW PRORATED MILES: HCPCS | Mod: ORL | Performed by: NURSE PRACTITIONER

## 2022-11-21 PROCEDURE — 85610 PROTHROMBIN TIME: CPT | Mod: ORL | Performed by: NURSE PRACTITIONER

## 2022-11-27 ENCOUNTER — LAB REQUISITION (OUTPATIENT)
Dept: LAB | Facility: CLINIC | Age: 82
End: 2022-11-27
Payer: COMMERCIAL

## 2022-11-27 DIAGNOSIS — I48.0 PAROXYSMAL ATRIAL FIBRILLATION (H): ICD-10-CM

## 2022-11-28 LAB — INR PPP: 2.49 (ref 0.85–1.15)

## 2022-11-28 PROCEDURE — 36415 COLL VENOUS BLD VENIPUNCTURE: CPT | Mod: ORL | Performed by: NURSE PRACTITIONER

## 2022-11-28 PROCEDURE — 85610 PROTHROMBIN TIME: CPT | Mod: ORL | Performed by: NURSE PRACTITIONER

## 2022-11-28 PROCEDURE — P9603 ONE-WAY ALLOW PRORATED MILES: HCPCS | Mod: ORL | Performed by: NURSE PRACTITIONER

## 2022-12-06 ENCOUNTER — LAB REQUISITION (OUTPATIENT)
Dept: LAB | Facility: CLINIC | Age: 82
End: 2022-12-06
Payer: COMMERCIAL

## 2022-12-06 DIAGNOSIS — I48.0 PAROXYSMAL ATRIAL FIBRILLATION (H): ICD-10-CM

## 2022-12-07 LAB — INR PPP: 2.56 (ref 0.85–1.15)

## 2022-12-07 PROCEDURE — 85610 PROTHROMBIN TIME: CPT | Mod: ORL | Performed by: NURSE PRACTITIONER

## 2022-12-07 PROCEDURE — P9603 ONE-WAY ALLOW PRORATED MILES: HCPCS | Mod: ORL | Performed by: NURSE PRACTITIONER

## 2022-12-07 PROCEDURE — 36415 COLL VENOUS BLD VENIPUNCTURE: CPT | Mod: ORL | Performed by: NURSE PRACTITIONER

## 2022-12-20 ENCOUNTER — LAB REQUISITION (OUTPATIENT)
Dept: LAB | Facility: CLINIC | Age: 82
End: 2022-12-20
Payer: COMMERCIAL

## 2022-12-20 DIAGNOSIS — I48.0 PAROXYSMAL ATRIAL FIBRILLATION (H): ICD-10-CM

## 2022-12-21 LAB — INR PPP: 2.48 (ref 0.85–1.15)

## 2022-12-21 PROCEDURE — 85610 PROTHROMBIN TIME: CPT | Mod: ORL | Performed by: NURSE PRACTITIONER

## 2022-12-21 PROCEDURE — 36415 COLL VENOUS BLD VENIPUNCTURE: CPT | Mod: ORL | Performed by: NURSE PRACTITIONER

## 2022-12-21 PROCEDURE — P9603 ONE-WAY ALLOW PRORATED MILES: HCPCS | Mod: ORL | Performed by: NURSE PRACTITIONER

## 2023-01-01 ENCOUNTER — LAB REQUISITION (OUTPATIENT)
Dept: LAB | Facility: CLINIC | Age: 83
End: 2023-01-01
Payer: COMMERCIAL

## 2023-01-01 DIAGNOSIS — I48.0 PAROXYSMAL ATRIAL FIBRILLATION (H): ICD-10-CM

## 2023-01-01 DIAGNOSIS — E87.6 HYPOKALEMIA: ICD-10-CM

## 2023-01-01 DIAGNOSIS — I48.91 UNSPECIFIED ATRIAL FIBRILLATION (H): ICD-10-CM

## 2023-01-01 LAB
ANION GAP SERPL CALCULATED.3IONS-SCNC: 14 MMOL/L (ref 7–15)
BUN SERPL-MCNC: 11.2 MG/DL (ref 8–23)
CALCIUM SERPL-MCNC: 8.6 MG/DL (ref 8.8–10.2)
CHLORIDE SERPL-SCNC: 105 MMOL/L (ref 98–107)
CREAT SERPL-MCNC: 0.59 MG/DL (ref 0.51–0.95)
DEPRECATED HCO3 PLAS-SCNC: 22 MMOL/L (ref 22–29)
EGFRCR SERPLBLD CKD-EPI 2021: 89 ML/MIN/1.73M2
ERYTHROCYTE [DISTWIDTH] IN BLOOD BY AUTOMATED COUNT: 14.8 % (ref 10–15)
GLUCOSE SERPL-MCNC: 101 MG/DL (ref 70–99)
HCT VFR BLD AUTO: 39.3 % (ref 35–47)
HGB BLD-MCNC: 12.4 G/DL (ref 11.7–15.7)
INR PPP: 1.81 (ref 0.85–1.15)
INR PPP: 2.13 (ref 0.85–1.15)
INR PPP: 2.16 (ref 0.85–1.15)
INR PPP: 2.19 (ref 0.85–1.15)
INR PPP: 2.22 (ref 0.85–1.15)
INR PPP: 2.28 (ref 0.85–1.15)
INR PPP: 2.29 (ref 0.85–1.15)
INR PPP: 2.86 (ref 0.85–1.15)
MCH RBC QN AUTO: 28.4 PG (ref 26.5–33)
MCHC RBC AUTO-ENTMCNC: 31.6 G/DL (ref 31.5–36.5)
MCV RBC AUTO: 90 FL (ref 78–100)
PLATELET # BLD AUTO: 256 10E3/UL (ref 150–450)
POTASSIUM SERPL-SCNC: 3.2 MMOL/L (ref 3.4–5.3)
POTASSIUM SERPL-SCNC: 3.4 MMOL/L (ref 3.4–5.3)
RBC # BLD AUTO: 4.36 10E6/UL (ref 3.8–5.2)
SODIUM SERPL-SCNC: 141 MMOL/L (ref 136–145)
WBC # BLD AUTO: 3.9 10E3/UL (ref 4–11)

## 2023-01-01 PROCEDURE — 85610 PROTHROMBIN TIME: CPT | Mod: ORL | Performed by: NURSE PRACTITIONER

## 2023-01-01 PROCEDURE — P9603 ONE-WAY ALLOW PRORATED MILES: HCPCS | Mod: ORL | Performed by: NURSE PRACTITIONER

## 2023-01-01 PROCEDURE — 36415 COLL VENOUS BLD VENIPUNCTURE: CPT | Mod: ORL | Performed by: NURSE PRACTITIONER

## 2023-01-01 PROCEDURE — 84132 ASSAY OF SERUM POTASSIUM: CPT | Mod: ORL | Performed by: NURSE PRACTITIONER

## 2023-01-01 PROCEDURE — 85027 COMPLETE CBC AUTOMATED: CPT | Mod: ORL | Performed by: NURSE PRACTITIONER

## 2023-01-01 PROCEDURE — 80048 BASIC METABOLIC PNL TOTAL CA: CPT | Mod: ORL | Performed by: NURSE PRACTITIONER

## 2023-01-10 ENCOUNTER — LAB REQUISITION (OUTPATIENT)
Dept: LAB | Facility: CLINIC | Age: 83
End: 2023-01-10
Payer: COMMERCIAL

## 2023-01-10 DIAGNOSIS — I48.0 PAROXYSMAL ATRIAL FIBRILLATION (H): ICD-10-CM

## 2023-01-11 LAB — INR PPP: 2.66 (ref 0.85–1.15)

## 2023-01-11 PROCEDURE — P9603 ONE-WAY ALLOW PRORATED MILES: HCPCS | Mod: ORL | Performed by: NURSE PRACTITIONER

## 2023-01-11 PROCEDURE — 85610 PROTHROMBIN TIME: CPT | Mod: ORL | Performed by: NURSE PRACTITIONER

## 2023-01-11 PROCEDURE — 36415 COLL VENOUS BLD VENIPUNCTURE: CPT | Mod: ORL | Performed by: NURSE PRACTITIONER

## 2023-01-31 ENCOUNTER — LAB REQUISITION (OUTPATIENT)
Dept: LAB | Facility: CLINIC | Age: 83
End: 2023-01-31
Payer: COMMERCIAL

## 2023-01-31 DIAGNOSIS — I48.0 PAROXYSMAL ATRIAL FIBRILLATION (H): ICD-10-CM

## 2023-02-01 LAB — INR PPP: 2.69 (ref 0.85–1.15)

## 2023-02-01 PROCEDURE — P9603 ONE-WAY ALLOW PRORATED MILES: HCPCS | Mod: ORL | Performed by: NURSE PRACTITIONER

## 2023-02-01 PROCEDURE — 36415 COLL VENOUS BLD VENIPUNCTURE: CPT | Mod: ORL | Performed by: NURSE PRACTITIONER

## 2023-02-01 PROCEDURE — 85610 PROTHROMBIN TIME: CPT | Mod: ORL | Performed by: NURSE PRACTITIONER

## 2023-02-28 ENCOUNTER — LAB REQUISITION (OUTPATIENT)
Dept: LAB | Facility: CLINIC | Age: 83
End: 2023-02-28
Payer: COMMERCIAL

## 2023-02-28 DIAGNOSIS — I48.0 PAROXYSMAL ATRIAL FIBRILLATION (H): ICD-10-CM

## 2023-03-01 LAB — INR PPP: 2.77 (ref 0.85–1.15)

## 2023-03-01 PROCEDURE — P9603 ONE-WAY ALLOW PRORATED MILES: HCPCS | Mod: ORL | Performed by: NURSE PRACTITIONER

## 2023-03-01 PROCEDURE — 36415 COLL VENOUS BLD VENIPUNCTURE: CPT | Mod: ORL | Performed by: NURSE PRACTITIONER

## 2023-03-01 PROCEDURE — 85610 PROTHROMBIN TIME: CPT | Mod: ORL | Performed by: NURSE PRACTITIONER

## 2023-03-28 ENCOUNTER — LAB REQUISITION (OUTPATIENT)
Dept: LAB | Facility: CLINIC | Age: 83
End: 2023-03-28
Payer: COMMERCIAL

## 2023-03-28 DIAGNOSIS — I48.0 PAROXYSMAL ATRIAL FIBRILLATION (H): ICD-10-CM

## 2023-03-29 LAB — INR PPP: 2.67 (ref 0.85–1.15)

## 2023-03-29 PROCEDURE — 36415 COLL VENOUS BLD VENIPUNCTURE: CPT | Mod: ORL | Performed by: NURSE PRACTITIONER

## 2023-03-29 PROCEDURE — P9603 ONE-WAY ALLOW PRORATED MILES: HCPCS | Mod: ORL | Performed by: NURSE PRACTITIONER

## 2023-03-29 PROCEDURE — 85610 PROTHROMBIN TIME: CPT | Mod: ORL | Performed by: NURSE PRACTITIONER

## 2023-04-25 ENCOUNTER — LAB REQUISITION (OUTPATIENT)
Dept: LAB | Facility: CLINIC | Age: 83
End: 2023-04-25
Payer: COMMERCIAL

## 2023-04-25 DIAGNOSIS — I48.91 UNSPECIFIED ATRIAL FIBRILLATION (H): ICD-10-CM

## 2023-04-26 LAB — INR PPP: 2.69 (ref 0.85–1.15)

## 2023-04-26 PROCEDURE — 36415 COLL VENOUS BLD VENIPUNCTURE: CPT | Mod: ORL | Performed by: NURSE PRACTITIONER

## 2023-04-26 PROCEDURE — 85610 PROTHROMBIN TIME: CPT | Mod: ORL | Performed by: NURSE PRACTITIONER

## 2023-04-26 PROCEDURE — P9603 ONE-WAY ALLOW PRORATED MILES: HCPCS | Mod: ORL | Performed by: NURSE PRACTITIONER

## 2023-05-23 ENCOUNTER — LAB REQUISITION (OUTPATIENT)
Dept: LAB | Facility: CLINIC | Age: 83
End: 2023-05-23
Payer: COMMERCIAL

## 2023-05-23 DIAGNOSIS — I48.0 PAROXYSMAL ATRIAL FIBRILLATION (H): ICD-10-CM

## 2023-05-24 LAB — INR PPP: 2.99 (ref 0.85–1.15)

## 2023-05-24 PROCEDURE — 36415 COLL VENOUS BLD VENIPUNCTURE: CPT | Mod: ORL | Performed by: NURSE PRACTITIONER

## 2023-05-24 PROCEDURE — 85610 PROTHROMBIN TIME: CPT | Mod: ORL | Performed by: NURSE PRACTITIONER

## 2023-06-20 ENCOUNTER — LAB REQUISITION (OUTPATIENT)
Dept: LAB | Facility: CLINIC | Age: 83
End: 2023-06-20
Payer: COMMERCIAL

## 2023-06-20 DIAGNOSIS — I48.0 PAROXYSMAL ATRIAL FIBRILLATION (H): ICD-10-CM

## 2023-06-21 LAB — INR PPP: 4.3 (ref 0.85–1.15)

## 2023-06-21 PROCEDURE — 36415 COLL VENOUS BLD VENIPUNCTURE: CPT | Mod: ORL | Performed by: NURSE PRACTITIONER

## 2023-06-21 PROCEDURE — 85610 PROTHROMBIN TIME: CPT | Mod: ORL | Performed by: NURSE PRACTITIONER

## 2023-06-21 PROCEDURE — P9603 ONE-WAY ALLOW PRORATED MILES: HCPCS | Mod: ORL | Performed by: NURSE PRACTITIONER

## 2023-06-22 ENCOUNTER — LAB REQUISITION (OUTPATIENT)
Dept: LAB | Facility: CLINIC | Age: 83
End: 2023-06-22
Payer: COMMERCIAL

## 2023-06-22 DIAGNOSIS — I48.0 PAROXYSMAL ATRIAL FIBRILLATION (H): ICD-10-CM

## 2023-06-23 LAB — INR PPP: 2.53 (ref 0.85–1.15)

## 2023-06-23 PROCEDURE — 85610 PROTHROMBIN TIME: CPT | Mod: ORL | Performed by: NURSE PRACTITIONER

## 2023-06-23 PROCEDURE — 36415 COLL VENOUS BLD VENIPUNCTURE: CPT | Mod: ORL | Performed by: NURSE PRACTITIONER

## 2023-06-23 PROCEDURE — P9603 ONE-WAY ALLOW PRORATED MILES: HCPCS | Mod: ORL | Performed by: NURSE PRACTITIONER

## 2023-06-27 ENCOUNTER — LAB REQUISITION (OUTPATIENT)
Dept: LAB | Facility: CLINIC | Age: 83
End: 2023-06-27
Payer: COMMERCIAL

## 2023-06-27 DIAGNOSIS — I48.0 PAROXYSMAL ATRIAL FIBRILLATION (H): ICD-10-CM

## 2023-06-28 LAB — INR PPP: 2.49 (ref 0.85–1.15)

## 2023-06-28 PROCEDURE — P9603 ONE-WAY ALLOW PRORATED MILES: HCPCS | Mod: ORL | Performed by: NURSE PRACTITIONER

## 2023-06-28 PROCEDURE — 36415 COLL VENOUS BLD VENIPUNCTURE: CPT | Mod: ORL | Performed by: NURSE PRACTITIONER

## 2023-06-28 PROCEDURE — 85610 PROTHROMBIN TIME: CPT | Mod: ORL | Performed by: NURSE PRACTITIONER

## 2023-07-02 ENCOUNTER — LAB REQUISITION (OUTPATIENT)
Dept: LAB | Facility: CLINIC | Age: 83
End: 2023-07-02
Payer: COMMERCIAL

## 2023-07-02 DIAGNOSIS — I48.91 UNSPECIFIED ATRIAL FIBRILLATION (H): ICD-10-CM

## 2023-07-03 LAB — INR PPP: 4.25 (ref 0.85–1.15)

## 2023-07-03 PROCEDURE — 85610 PROTHROMBIN TIME: CPT | Mod: ORL | Performed by: NURSE PRACTITIONER

## 2023-07-03 PROCEDURE — P9603 ONE-WAY ALLOW PRORATED MILES: HCPCS | Mod: ORL | Performed by: NURSE PRACTITIONER

## 2023-07-03 PROCEDURE — 36415 COLL VENOUS BLD VENIPUNCTURE: CPT | Mod: ORL | Performed by: NURSE PRACTITIONER

## 2023-07-06 ENCOUNTER — LAB REQUISITION (OUTPATIENT)
Dept: LAB | Facility: CLINIC | Age: 83
End: 2023-07-06
Payer: COMMERCIAL

## 2023-07-06 DIAGNOSIS — I48.20 CHRONIC ATRIAL FIBRILLATION, UNSPECIFIED (H): ICD-10-CM

## 2023-07-07 LAB — INR PPP: 2.27 (ref 0.85–1.15)

## 2023-07-07 PROCEDURE — P9603 ONE-WAY ALLOW PRORATED MILES: HCPCS | Mod: ORL | Performed by: NURSE PRACTITIONER

## 2023-07-07 PROCEDURE — 85610 PROTHROMBIN TIME: CPT | Mod: ORL | Performed by: NURSE PRACTITIONER

## 2023-07-07 PROCEDURE — 36415 COLL VENOUS BLD VENIPUNCTURE: CPT | Mod: ORL | Performed by: NURSE PRACTITIONER

## 2023-07-09 ENCOUNTER — LAB REQUISITION (OUTPATIENT)
Dept: LAB | Facility: CLINIC | Age: 83
End: 2023-07-09
Payer: COMMERCIAL

## 2023-07-09 DIAGNOSIS — I48.0 PAROXYSMAL ATRIAL FIBRILLATION (H): ICD-10-CM

## 2023-07-10 LAB — INR PPP: 2.82 (ref 0.85–1.15)

## 2023-07-10 PROCEDURE — 36415 COLL VENOUS BLD VENIPUNCTURE: CPT | Mod: ORL | Performed by: FAMILY MEDICINE

## 2023-07-10 PROCEDURE — 85610 PROTHROMBIN TIME: CPT | Mod: ORL | Performed by: FAMILY MEDICINE

## 2023-07-10 PROCEDURE — P9603 ONE-WAY ALLOW PRORATED MILES: HCPCS | Mod: ORL | Performed by: FAMILY MEDICINE

## 2023-07-13 ENCOUNTER — LAB REQUISITION (OUTPATIENT)
Dept: LAB | Facility: CLINIC | Age: 83
End: 2023-07-13
Payer: COMMERCIAL

## 2023-07-13 DIAGNOSIS — I48.91 UNSPECIFIED ATRIAL FIBRILLATION (H): ICD-10-CM

## 2023-07-14 LAB — INR PPP: 2.86 (ref 0.85–1.15)

## 2023-07-14 PROCEDURE — 36415 COLL VENOUS BLD VENIPUNCTURE: CPT | Mod: ORL | Performed by: NURSE PRACTITIONER

## 2023-07-14 PROCEDURE — P9603 ONE-WAY ALLOW PRORATED MILES: HCPCS | Mod: ORL | Performed by: NURSE PRACTITIONER

## 2023-07-14 PROCEDURE — 85610 PROTHROMBIN TIME: CPT | Mod: ORL | Performed by: NURSE PRACTITIONER

## 2023-07-20 ENCOUNTER — LAB REQUISITION (OUTPATIENT)
Dept: LAB | Facility: CLINIC | Age: 83
End: 2023-07-20
Payer: COMMERCIAL

## 2023-07-20 DIAGNOSIS — I48.91 UNSPECIFIED ATRIAL FIBRILLATION (H): ICD-10-CM

## 2023-07-21 LAB — INR PPP: 3.16 (ref 0.85–1.15)

## 2023-07-21 PROCEDURE — 36415 COLL VENOUS BLD VENIPUNCTURE: CPT | Mod: ORL | Performed by: NURSE PRACTITIONER

## 2023-07-21 PROCEDURE — P9603 ONE-WAY ALLOW PRORATED MILES: HCPCS | Mod: ORL | Performed by: NURSE PRACTITIONER

## 2023-07-21 PROCEDURE — 85610 PROTHROMBIN TIME: CPT | Mod: ORL | Performed by: NURSE PRACTITIONER

## 2023-07-27 ENCOUNTER — LAB REQUISITION (OUTPATIENT)
Dept: LAB | Facility: CLINIC | Age: 83
End: 2023-07-27
Payer: COMMERCIAL

## 2023-07-27 DIAGNOSIS — I48.0 PAROXYSMAL ATRIAL FIBRILLATION (H): ICD-10-CM

## 2023-07-28 LAB — INR PPP: 3.62 (ref 0.85–1.15)

## 2023-07-28 PROCEDURE — P9603 ONE-WAY ALLOW PRORATED MILES: HCPCS | Mod: ORL | Performed by: NURSE PRACTITIONER

## 2023-07-28 PROCEDURE — 85610 PROTHROMBIN TIME: CPT | Mod: ORL | Performed by: NURSE PRACTITIONER

## 2023-07-28 PROCEDURE — 36415 COLL VENOUS BLD VENIPUNCTURE: CPT | Mod: ORL | Performed by: NURSE PRACTITIONER

## 2023-07-30 ENCOUNTER — LAB REQUISITION (OUTPATIENT)
Dept: LAB | Facility: CLINIC | Age: 83
End: 2023-07-30
Payer: COMMERCIAL

## 2023-07-30 DIAGNOSIS — I48.0 PAROXYSMAL ATRIAL FIBRILLATION (H): ICD-10-CM

## 2023-07-31 LAB — INR PPP: 2.43 (ref 0.85–1.15)

## 2023-07-31 PROCEDURE — 36415 COLL VENOUS BLD VENIPUNCTURE: CPT | Mod: ORL | Performed by: NURSE PRACTITIONER

## 2023-07-31 PROCEDURE — 85610 PROTHROMBIN TIME: CPT | Mod: ORL | Performed by: NURSE PRACTITIONER

## 2023-07-31 PROCEDURE — P9603 ONE-WAY ALLOW PRORATED MILES: HCPCS | Mod: ORL | Performed by: NURSE PRACTITIONER

## 2023-08-01 ENCOUNTER — LAB REQUISITION (OUTPATIENT)
Dept: LAB | Facility: CLINIC | Age: 83
End: 2023-08-01
Payer: COMMERCIAL

## 2023-08-01 DIAGNOSIS — I48.0 PAROXYSMAL ATRIAL FIBRILLATION (H): ICD-10-CM

## 2023-08-02 LAB — INR PPP: 2.4 (ref 0.85–1.15)

## 2023-08-02 PROCEDURE — 36415 COLL VENOUS BLD VENIPUNCTURE: CPT | Mod: ORL | Performed by: NURSE PRACTITIONER

## 2023-08-02 PROCEDURE — 85610 PROTHROMBIN TIME: CPT | Mod: ORL | Performed by: NURSE PRACTITIONER

## 2023-08-02 PROCEDURE — P9603 ONE-WAY ALLOW PRORATED MILES: HCPCS | Mod: ORL | Performed by: NURSE PRACTITIONER

## 2023-08-06 ENCOUNTER — LAB REQUISITION (OUTPATIENT)
Dept: LAB | Facility: CLINIC | Age: 83
End: 2023-08-06
Payer: COMMERCIAL

## 2023-08-06 DIAGNOSIS — I48.91 UNSPECIFIED ATRIAL FIBRILLATION (H): ICD-10-CM

## 2023-08-07 LAB — INR PPP: 2.43 (ref 0.85–1.15)

## 2023-08-07 PROCEDURE — 36415 COLL VENOUS BLD VENIPUNCTURE: CPT | Mod: ORL | Performed by: NURSE PRACTITIONER

## 2023-08-07 PROCEDURE — 85610 PROTHROMBIN TIME: CPT | Mod: ORL | Performed by: NURSE PRACTITIONER

## 2023-08-07 PROCEDURE — P9603 ONE-WAY ALLOW PRORATED MILES: HCPCS | Mod: ORL | Performed by: NURSE PRACTITIONER

## 2023-08-13 ENCOUNTER — LAB REQUISITION (OUTPATIENT)
Dept: LAB | Facility: CLINIC | Age: 83
End: 2023-08-13
Payer: COMMERCIAL

## 2023-08-13 DIAGNOSIS — I48.0 PAROXYSMAL ATRIAL FIBRILLATION (H): ICD-10-CM

## 2023-08-14 LAB — INR PPP: 3.09 (ref 0.85–1.15)

## 2023-08-14 PROCEDURE — P9603 ONE-WAY ALLOW PRORATED MILES: HCPCS | Mod: ORL | Performed by: NURSE PRACTITIONER

## 2023-08-14 PROCEDURE — 85610 PROTHROMBIN TIME: CPT | Mod: ORL | Performed by: NURSE PRACTITIONER

## 2023-08-14 PROCEDURE — 36415 COLL VENOUS BLD VENIPUNCTURE: CPT | Mod: ORL | Performed by: NURSE PRACTITIONER

## 2023-08-17 ENCOUNTER — LAB REQUISITION (OUTPATIENT)
Dept: LAB | Facility: CLINIC | Age: 83
End: 2023-08-17
Payer: COMMERCIAL

## 2023-08-17 DIAGNOSIS — I48.0 PAROXYSMAL ATRIAL FIBRILLATION (H): ICD-10-CM

## 2023-08-18 LAB — INR PPP: 2.7 (ref 0.85–1.15)

## 2023-08-18 PROCEDURE — 36415 COLL VENOUS BLD VENIPUNCTURE: CPT | Mod: ORL | Performed by: NURSE PRACTITIONER

## 2023-08-18 PROCEDURE — 85610 PROTHROMBIN TIME: CPT | Mod: ORL | Performed by: NURSE PRACTITIONER

## 2023-08-18 PROCEDURE — P9603 ONE-WAY ALLOW PRORATED MILES: HCPCS | Mod: ORL | Performed by: NURSE PRACTITIONER

## 2023-08-24 ENCOUNTER — LAB REQUISITION (OUTPATIENT)
Dept: LAB | Facility: CLINIC | Age: 83
End: 2023-08-24
Payer: COMMERCIAL

## 2023-08-24 DIAGNOSIS — I48.91 UNSPECIFIED ATRIAL FIBRILLATION (H): ICD-10-CM

## 2023-08-25 LAB — INR PPP: 2.53 (ref 0.85–1.15)

## 2023-08-25 PROCEDURE — P9603 ONE-WAY ALLOW PRORATED MILES: HCPCS | Mod: ORL | Performed by: NURSE PRACTITIONER

## 2023-08-25 PROCEDURE — 36415 COLL VENOUS BLD VENIPUNCTURE: CPT | Mod: ORL | Performed by: NURSE PRACTITIONER

## 2023-08-25 PROCEDURE — 85610 PROTHROMBIN TIME: CPT | Mod: ORL | Performed by: NURSE PRACTITIONER

## 2023-08-31 ENCOUNTER — LAB REQUISITION (OUTPATIENT)
Dept: LAB | Facility: CLINIC | Age: 83
End: 2023-08-31
Payer: COMMERCIAL

## 2023-08-31 DIAGNOSIS — I48.0 PAROXYSMAL ATRIAL FIBRILLATION (H): ICD-10-CM

## 2023-09-01 LAB — INR PPP: 3.35 (ref 0.85–1.15)

## 2023-09-01 PROCEDURE — 36415 COLL VENOUS BLD VENIPUNCTURE: CPT | Mod: ORL | Performed by: NURSE PRACTITIONER

## 2023-09-01 PROCEDURE — 85610 PROTHROMBIN TIME: CPT | Mod: ORL | Performed by: NURSE PRACTITIONER

## 2023-09-01 PROCEDURE — P9603 ONE-WAY ALLOW PRORATED MILES: HCPCS | Mod: ORL | Performed by: NURSE PRACTITIONER

## 2023-09-07 ENCOUNTER — LAB REQUISITION (OUTPATIENT)
Dept: LAB | Facility: CLINIC | Age: 83
End: 2023-09-07
Payer: COMMERCIAL

## 2023-09-07 DIAGNOSIS — I48.0 PAROXYSMAL ATRIAL FIBRILLATION (H): ICD-10-CM

## 2023-09-08 LAB — INR PPP: 2.69 (ref 0.85–1.15)

## 2023-09-08 PROCEDURE — 85610 PROTHROMBIN TIME: CPT | Mod: ORL | Performed by: NURSE PRACTITIONER

## 2023-09-08 PROCEDURE — P9603 ONE-WAY ALLOW PRORATED MILES: HCPCS | Mod: ORL | Performed by: NURSE PRACTITIONER

## 2023-09-08 PROCEDURE — 36415 COLL VENOUS BLD VENIPUNCTURE: CPT | Mod: ORL | Performed by: NURSE PRACTITIONER

## 2023-09-14 ENCOUNTER — LAB REQUISITION (OUTPATIENT)
Dept: LAB | Facility: CLINIC | Age: 83
End: 2023-09-14
Payer: COMMERCIAL

## 2023-09-14 DIAGNOSIS — I48.91 UNSPECIFIED ATRIAL FIBRILLATION (H): ICD-10-CM

## 2023-09-15 LAB — INR PPP: 2.65 (ref 0.85–1.15)

## 2023-09-15 PROCEDURE — P9603 ONE-WAY ALLOW PRORATED MILES: HCPCS | Mod: ORL | Performed by: NURSE PRACTITIONER

## 2023-09-15 PROCEDURE — 36415 COLL VENOUS BLD VENIPUNCTURE: CPT | Mod: ORL | Performed by: NURSE PRACTITIONER

## 2023-09-15 PROCEDURE — 85610 PROTHROMBIN TIME: CPT | Mod: ORL | Performed by: NURSE PRACTITIONER

## 2023-09-21 ENCOUNTER — LAB REQUISITION (OUTPATIENT)
Dept: LAB | Facility: CLINIC | Age: 83
End: 2023-09-21
Payer: COMMERCIAL

## 2023-09-21 DIAGNOSIS — I48.0 PAROXYSMAL ATRIAL FIBRILLATION (H): ICD-10-CM

## 2023-09-21 DIAGNOSIS — I10 ESSENTIAL (PRIMARY) HYPERTENSION: ICD-10-CM

## 2024-01-01 ENCOUNTER — LAB REQUISITION (OUTPATIENT)
Dept: LAB | Facility: CLINIC | Age: 84
End: 2024-01-01
Payer: COMMERCIAL

## 2024-01-01 DIAGNOSIS — I48.0 PAROXYSMAL ATRIAL FIBRILLATION (H): ICD-10-CM

## 2024-01-01 DIAGNOSIS — I10 ESSENTIAL (PRIMARY) HYPERTENSION: ICD-10-CM

## 2024-01-01 DIAGNOSIS — D64.9 ANEMIA, UNSPECIFIED: ICD-10-CM

## 2024-01-01 DIAGNOSIS — Z51.81 ENCOUNTER FOR THERAPEUTIC DRUG LEVEL MONITORING: ICD-10-CM

## 2024-01-01 LAB
ANION GAP SERPL CALCULATED.3IONS-SCNC: 12 MMOL/L (ref 7–15)
BUN SERPL-MCNC: 12.4 MG/DL (ref 8–23)
CALCIUM SERPL-MCNC: 9.3 MG/DL (ref 8.8–10.2)
CHLORIDE SERPL-SCNC: 102 MMOL/L (ref 98–107)
CREAT SERPL-MCNC: 0.64 MG/DL (ref 0.51–0.95)
DEPRECATED HCO3 PLAS-SCNC: 26 MMOL/L (ref 22–29)
EGFRCR SERPLBLD CKD-EPI 2021: 87 ML/MIN/1.73M2
ERYTHROCYTE [DISTWIDTH] IN BLOOD BY AUTOMATED COUNT: 17.9 % (ref 10–15)
FERRITIN SERPL-MCNC: 407 NG/ML (ref 11–328)
GLUCOSE SERPL-MCNC: 93 MG/DL (ref 70–99)
HCT VFR BLD AUTO: 31.5 % (ref 35–47)
HGB BLD-MCNC: 9.5 G/DL (ref 11.7–15.7)
INR PPP: 2.42 (ref 0.85–1.15)
INR PPP: 2.5 (ref 0.85–1.15)
INR PPP: 2.58 (ref 0.85–1.15)
INR PPP: 2.62 (ref 0.85–1.15)
INR PPP: 2.66 (ref 0.85–1.15)
INR PPP: 2.73 (ref 0.85–1.15)
INR PPP: 2.74 (ref 0.85–1.15)
INR PPP: 2.84 (ref 0.85–1.15)
INR PPP: 2.85 (ref 0.85–1.15)
INR PPP: 2.85 (ref 0.85–1.15)
INR PPP: 2.88 (ref 0.85–1.15)
INR PPP: 3.01 (ref 0.85–1.15)
INR PPP: 3.02 (ref 0.85–1.15)
INR PPP: 3.58 (ref 0.85–1.15)
INR PPP: 3.72 (ref 0.85–1.15)
INR PPP: 3.79 (ref 0.85–1.15)
INR PPP: 4.78 (ref 0.85–1.15)
IRON BINDING CAPACITY (ROCHE): 211 UG/DL (ref 240–430)
IRON SATN MFR SERPL: 10 % (ref 15–46)
IRON SERPL-MCNC: 21 UG/DL (ref 37–145)
MCH RBC QN AUTO: 25.5 PG (ref 26.5–33)
MCHC RBC AUTO-ENTMCNC: 30.2 G/DL (ref 31.5–36.5)
MCV RBC AUTO: 85 FL (ref 78–100)
PLATELET # BLD AUTO: 372 10E3/UL (ref 150–450)
POTASSIUM SERPL-SCNC: 4.3 MMOL/L (ref 3.4–5.3)
RBC # BLD AUTO: 3.73 10E6/UL (ref 3.8–5.2)
SODIUM SERPL-SCNC: 140 MMOL/L (ref 135–145)
TSH SERPL DL<=0.005 MIU/L-ACNC: 2.58 UIU/ML (ref 0.3–4.2)
WBC # BLD AUTO: 6.3 10E3/UL (ref 4–11)

## 2024-01-01 PROCEDURE — 85610 PROTHROMBIN TIME: CPT | Mod: ORL | Performed by: NURSE PRACTITIONER

## 2024-01-01 PROCEDURE — P9603 ONE-WAY ALLOW PRORATED MILES: HCPCS | Mod: ORL | Performed by: NURSE PRACTITIONER

## 2024-01-01 PROCEDURE — 36415 COLL VENOUS BLD VENIPUNCTURE: CPT | Mod: ORL | Performed by: NURSE PRACTITIONER

## 2024-01-01 PROCEDURE — 80048 BASIC METABOLIC PNL TOTAL CA: CPT | Mod: ORL | Performed by: NURSE PRACTITIONER

## 2024-01-01 PROCEDURE — 84443 ASSAY THYROID STIM HORMONE: CPT | Mod: ORL | Performed by: NURSE PRACTITIONER

## 2024-01-01 PROCEDURE — 85027 COMPLETE CBC AUTOMATED: CPT | Mod: ORL | Performed by: NURSE PRACTITIONER

## 2024-01-01 PROCEDURE — 83550 IRON BINDING TEST: CPT | Mod: ORL | Performed by: FAMILY MEDICINE

## 2024-01-01 PROCEDURE — 83540 ASSAY OF IRON: CPT | Mod: ORL | Performed by: FAMILY MEDICINE

## 2024-01-01 PROCEDURE — 82728 ASSAY OF FERRITIN: CPT | Mod: ORL | Performed by: NURSE PRACTITIONER
